# Patient Record
Sex: FEMALE | Race: WHITE | NOT HISPANIC OR LATINO | Employment: FULL TIME | URBAN - NONMETROPOLITAN AREA
[De-identification: names, ages, dates, MRNs, and addresses within clinical notes are randomized per-mention and may not be internally consistent; named-entity substitution may affect disease eponyms.]

---

## 2022-12-20 RX ORDER — CETIRIZINE HYDROCHLORIDE 10 MG/1
CAPSULE, LIQUID FILLED ORAL EVERY 24 HOURS
COMMUNITY

## 2022-12-20 RX ORDER — MONTELUKAST SODIUM 10 MG/1
10 TABLET ORAL
COMMUNITY

## 2022-12-20 RX ORDER — LEVOTHYROXINE SODIUM 0.03 MG/1
TABLET ORAL
COMMUNITY
Start: 2022-09-20

## 2022-12-20 RX ORDER — GUAIFENESIN 600 MG/1
1200 TABLET, EXTENDED RELEASE ORAL
COMMUNITY

## 2022-12-20 RX ORDER — IBUPROFEN 200 MG
200 TABLET ORAL
COMMUNITY
End: 2022-12-21

## 2022-12-20 RX ORDER — LORATADINE 10 MG/1
10 TABLET ORAL DAILY
COMMUNITY

## 2022-12-20 RX ORDER — ESTRADIOL 0.07 MG/D
1 FILM, EXTENDED RELEASE TRANSDERMAL
COMMUNITY
Start: 2022-03-17 | End: 2023-03-17

## 2022-12-20 RX ORDER — CYCLOBENZAPRINE HCL 10 MG
10 TABLET ORAL 3 TIMES DAILY PRN
COMMUNITY
End: 2022-12-21

## 2022-12-20 RX ORDER — OMEGA-3S/DHA/EPA/FISH OIL/D3 300MG-1000
CAPSULE ORAL
COMMUNITY

## 2022-12-20 RX ORDER — PROGESTERONE 200 MG/1
200 CAPSULE ORAL
COMMUNITY
Start: 2022-10-10 | End: 2023-10-10

## 2022-12-21 ENCOUNTER — CONSULT (OUTPATIENT)
Dept: PAIN MEDICINE | Facility: CLINIC | Age: 52
End: 2022-12-21

## 2022-12-21 ENCOUNTER — HOSPITAL ENCOUNTER (OUTPATIENT)
Dept: RADIOLOGY | Facility: CLINIC | Age: 52
Discharge: HOME | End: 2022-12-21

## 2022-12-21 VITALS
HEIGHT: 61 IN | SYSTOLIC BLOOD PRESSURE: 134 MMHG | HEART RATE: 101 BPM | WEIGHT: 196.6 LBS | DIASTOLIC BLOOD PRESSURE: 86 MMHG | BODY MASS INDEX: 37.12 KG/M2 | TEMPERATURE: 97.7 F | RESPIRATION RATE: 20 BRPM

## 2022-12-21 DIAGNOSIS — M47.816 LUMBAR SPONDYLOSIS: ICD-10-CM

## 2022-12-21 DIAGNOSIS — M53.3 SACROILIAC JOINT DYSFUNCTION OF BOTH SIDES: ICD-10-CM

## 2022-12-21 DIAGNOSIS — M47.816 LUMBAR SPONDYLOSIS: Primary | ICD-10-CM

## 2022-12-21 RX ORDER — CELECOXIB 200 MG/1
200 CAPSULE ORAL 2 TIMES DAILY
Qty: 60 CAPSULE | Refills: 0 | Status: SHIPPED | OUTPATIENT
Start: 2022-12-21

## 2022-12-21 NOTE — PROGRESS NOTES
Assessment  1  Lumbar spondylosis  -     X-ray lumbar spine 2 or 3 views; Future; Expected date: 12/21/2022    2  Sacroiliac joint dysfunction of both sides  -     X-ray lumbar spine 2 or 3 views; Future; Expected date: 12/21/2022    Left sided xial low back pain described primarily by arthritic features  Aching, nagging, indolent, stabbing, throbbing features in left sided axial low back without radicular components  5/5 strength bilaterally, negative SLR  weakly positive facet loading maneuvers in lumbar spine elicited pain, positive tenderness to palpation over lumbar paraspinal muscles  Findings correlate with prominent lumbar facet arthropathy seen throughout axial low back on x-ray from 2018  Currently she is neurologically intact without gait instability, saddle anesthesia or bowel/bladder abnormality  Lifestyle modifications extensively discussed including diet, exercise and weight loss as well as core strengthening/PT  Risks, benefits alternative to left sacroiliac joint injection, medial branch blocks and subsequent radiofrequency ablation of successful thoroughly discussed with patient  Handouts provided questions answered to patient satisfaction  Plan  -Left sacroiliac joint injection (patient to call back to schedule procedure); f/u 2 weeks post procedure  -xray lumbar spine; will f/u result with patient  -f/u 6 weeks after PT  -celebrex 200 mg b i d  prn pain prescribed  Patient educated regarding bleeding risk of taking this medication as well as not taking any other nonsteroidal anti-inflammatory medications while taking this medication; counseled thoroughly regarding potential risk of Cardiovascular injury, Kidney injury, Gastrointestinal ulceration/bleeding   Patient voiced understanding  -script provided for formal physical therapy for right-sided lumbar radiculopathy; Physician directed home exercise plan as per [facility] demonstrated and handouts provided that patient plans to participate with for 1 hour, twice a week for the next 6 weeks  There are risks associated with opioid medications, including dependence, addiction and tolerance  The patient understands and agrees to use these medications only as prescribed  Potential side effects of the medications include, but are not limited to, constipation, drowsiness, addiction, impaired judgment and risk of fatal overdose if not taken as prescribed  The patient was warned against driving while taking sedation medications or operating heavy machinery  The patient voiced understanding  Sharing medications is a felony  At this point in time, the patient is showing no signs of addiction, abuse, diversion or suicidal ideation  [facility] Prescription Drug Monitoring Program report was reviewed and was appropriate      Complete risks and benefits including bleeding, infection, tissue reaction, nerve injury and allergic reaction were discussed  The approach was demonstrated using models and literature was provided  Verbal and written consent was obtained  My impressions and treatment recommendations were discussed in detail with the patient who verbalized understanding and had no further questions  Discharge instructions were provided  I personally saw and examined the patient and I agree with the above discussed plan of care      New Medications Ordered This Visit   Medications   • ASCORBIC ACID PO     Sig: Take by mouth   • FEXOFENADINE HCL PO     Sig: Take by mouth   • MULTIPLE VITAMIN PO     Sig: Take 1 tablet by mouth daily   • MULTIPLE VITAMIN PO     Sig: Take 1 capsule by mouth daily   • PSEUDOEPHEDRINE HCL PO     Sig: Take by mouth   • Beclomethasone Dipropionate (QVAR IN)   • Turmeric (QC TUMERIC COMPLEX PO)     Sig: Take by mouth   • Cetirizine HCl (ZyrTEC Allergy) 10 MG CAPS     Sig: every 24 hours   • cholecalciferol (VITAMIN D3) 400 units tablet     Sig: Take by mouth   • cyclobenzaprine (FLEXERIL) 10 mg tablet     Sig: Take 10 mg by mouth Three times daily as needed   • estradiol (VIVELLE-DOT) 0 075 MG/24HR     Sig: Place 1 patch on the skin   • flurbiprofen (ANSAID) 100 mg tablet     Sig: Take 100 mg by mouth   • guaiFENesin (MUCINEX) 600 mg 12 hr tablet     Sig: Take 1,200 mg by mouth   • ibuprofen (MOTRIN) 200 mg tablet     Sig: Take 200 mg by mouth   • levothyroxine 25 mcg tablet     Sig: TAKE ONE TABLET BY MOUTH 60 MINUTES BEFORE MEAL  • loratadine (CLARITIN) 10 mg tablet     Sig: Take 10 mg by mouth daily   • montelukast (SINGULAIR) 10 mg tablet     Sig: Take 10 mg by mouth   • Progesterone 200 MG CAPS     Sig: Take 200 mg by mouth   • Multiple Vitamins-Minerals (EMERGEN-C BLUE PO)     Sig: Take by mouth       History of Present Illness    John Ventura is a 47 y o  female with pmhx of hypothyroidism, obesity presenting with chronic left sided low back pain described primarily as arthritic in nature  She describes 8/10 low back pain that is worse in the mornings and worse at the end of the day  The pain is characterized by achy, nagging, indolent, crampy, stabbing pain in her axial left low back  The patient describes that the pain is worse with standing for long periods of time on hard surfaces as well as with walking  The patient is a very active individual and feels as though this pain compromises her participation with independent activities of daily living  The pain can be debilitating at times and contribute to significant disability, compromising overall activity and independent activities of daily living  She has not yet tried physical therapy formally but is working with a  at [facility]  Medications the patient has tried in the past include flexeril, nsaids with modest relief  She describes no radicular symptoms and has good strength  She denies any weakness numbness or paresthesias  The patient denies any bowel or bladder dysfunction as well, saddle anesthesia or gait instability         I have personally reviewed and/or updated the patient's past medical history, past surgical history, family history, social history, current medications, allergies, and vital signs today  Review of Systems   Constitutional: Positive for activity change  HENT: Negative  Eyes: Negative  Respiratory: Negative  Cardiovascular: Negative  Gastrointestinal: Negative  Endocrine: Negative  Genitourinary: Negative  Musculoskeletal: Positive for arthralgias, back pain, gait problem and myalgias  Skin: Negative  Allergic/Immunologic: Negative  Neurological: Negative for weakness and numbness  Hematological: Negative  Psychiatric/Behavioral: Negative  All other systems reviewed and are negative  There is no problem list on file for this patient  Past Medical History:   Diagnosis Date   • Allergies    • Thyroid disease        Past Surgical History:   Procedure Laterality Date   •  SECTION     •  SECTION         Family History   Problem Relation Age of Onset   • Hypertension Mother    • Lung cancer Father        Social History     Occupational History   • Not on file   Tobacco Use   • Smoking status: Never   • Smokeless tobacco: Never   Vaping Use   • Vaping Use: Never used   Substance and Sexual Activity   • Alcohol use: Yes   • Drug use: Never   • Sexual activity: Yes       Current Outpatient Medications on File Prior to Visit   Medication Sig   • Cetirizine HCl (ZyrTEC Allergy) 10 MG CAPS every 24 hours   • cholecalciferol (VITAMIN D3) 400 units tablet Take by mouth   • estradiol (VIVELLE-DOT) 0 075 MG/24HR Place 1 patch on the skin   • flurbiprofen (ANSAID) 100 mg tablet Take 100 mg by mouth   • guaiFENesin (MUCINEX) 600 mg 12 hr tablet Take 1,200 mg by mouth   • levothyroxine 25 mcg tablet TAKE ONE TABLET BY MOUTH 60 MINUTES BEFORE MEAL     • MULTIPLE VITAMIN PO Take 1 tablet by mouth daily   • Multiple Vitamins-Minerals (EMERGEN-C BLUE PO) Take by mouth   • Progesterone 200 MG CAPS Take 200 mg by mouth   • PSEUDOEPHEDRINE HCL PO Take by mouth   • Turmeric (QC TUMERIC COMPLEX PO) Take by mouth   • ASCORBIC ACID PO Take by mouth (Patient not taking: Reported on 12/21/2022)   • Beclomethasone Dipropionate (QVAR IN)  (Patient not taking: Reported on 12/21/2022)   • cyclobenzaprine (FLEXERIL) 10 mg tablet Take 10 mg by mouth Three times daily as needed (Patient not taking: Reported on 12/21/2022)   • FEXOFENADINE HCL PO Take by mouth   • ibuprofen (MOTRIN) 200 mg tablet Take 200 mg by mouth (Patient not taking: Reported on 12/21/2022)   • loratadine (CLARITIN) 10 mg tablet Take 10 mg by mouth daily (Patient not taking: Reported on 12/21/2022)   • montelukast (SINGULAIR) 10 mg tablet Take 10 mg by mouth (Patient not taking: Reported on 12/21/2022)   • MULTIPLE VITAMIN PO Take 1 capsule by mouth daily (Patient not taking: Reported on 12/21/2022)     No current facility-administered medications on file prior to visit  Allergies   Allergen Reactions   • Molds & Smuts Other (See Comments)       Physical Exam    /86   Pulse 101   Temp 97 7 °F (36 5 °C)   Resp 20   Ht 5' 1" (1 549 m)   Wt 89 2 kg (196 lb 9 6 oz)   BMI 37 15 kg/m²     Constitutional: normal, well developed, well nourished, alert, in no distress and non-toxic and no overt pain behavior  and obese  Eyes: anicteric  HEENT: grossly intact  Neck: supple, symmetric, trachea midline and no masses   Pulmonary:even and unlabored  Cardiovascular:No edema or pitting edema present  Skin:Normal without rashes or lesions and well hydrated  Psychiatric:Mood and affect appropriate  Neurologic:Cranial Nerves II-XII grossly intact Sensation grossly intact; no clonus negative trammell's  Reflexes 2+ and brisk  SLR negative bilaterally  Musculoskeletal:normal gait  5/5 strength bilaterally with AROM in all extremities  pain with lumbar facet loading bilaterally and with lateral spine rotation  ttp over lumbar paraspinal muscles   Positive alex's test, gaenslen's, SIJ loading right sided    Imaging    2018 xray lumbar spine shows preserved disc spaces, mild facet joint arthrosis

## 2022-12-21 NOTE — PATIENT INSTRUCTIONS
Core Strengthening Exercises   WHAT YOU NEED TO KNOW:   Your core includes the muscles of your lower back, hip, pelvis, and abdomen  Core strengthening exercises help heal and strengthen these muscles  This helps prevent another injury, and keeps your pelvis, spine, and hips in the correct position  DISCHARGE INSTRUCTIONS:   Contact your healthcare provider if:   You have sharp or worsening pain during exercise or at rest     You have questions or concerns about your shoulder exercises  Safety tips:  Talk to your healthcare provider before you start an exercise program  A physical therapist can teach you how to do core strengthening exercises safely  Do the exercises on a mat or firm surface  A firm surface will support your spine and prevent low back pain  Do not do these exercises on a bed  Move slowly and smoothly  Avoid fast or jerky motions  Stop if you feel pain  Core exercises should not be painful  Stop if you feel pain  Breathe normally during core exercises  Do not hold your breath  This may cause an increase in blood pressure and prevent muscle strengthening  Your healthcare provider will tell you when to inhale and exhale during the exercise  Begin all of your exercises with abdominal bracing  Abdominal bracing helps warm up your core muscles  You can also practice abdominal bracing throughout the day  Lie on your back with your knees bent and feet flat on the floor  Place your arms in a relaxed position beside your body  Tighten your abdominal muscles  Pull your belly button in and up toward your spine  Hold for 5 seconds  Relax your muscles  Repeat 10 times  Core strengthening exercises: Your healthcare provider will tell you how often to do these exercises  The provider will also tell you how many repetitions of each exercise you should do  Hold each exercise for 5 seconds or as directed  As you get stronger, increase your hold to 10 to 15 seconds   You can do some of these exercises on a stability ball, or with a weight  Ask your healthcare provider how to use a stability ball or weight for these exercises:  Bridging:  Lie on your back with your knees bent and feet flat on the floor  Rest your arms at your side  Tighten your buttocks, and then lift your hips 1 inch off the floor  Hold for 5 seconds  When you can do this exercise without pain for 10 seconds, increase the distance you lift your hips  A good goal is to be able to lift your hips so that your shoulders, hips, and knees are in a straight line  Dead bug:  Lie on your back with your knees bent and feet flat on the floor  Place your arms in a relaxed position beside your body  Begin with abdominal bracing  Next, raise one leg, keeping your knee bent  Hold for 5 seconds  Repeat with the other leg  When you can do this exercise without pain for 10 to 15 seconds, you may raise one straight leg and hold  Repeat with the other leg  Quadruped:  Place your hands and knees on the floor  Keep your wrists directly below your shoulders and your knees directly below your hips  Pull your belly button in toward your spine  Do not flatten or arch your back  Tighten your abdominal muscles below your belly button  Hold for 5 seconds  When you can do this exercise without pain for 10 to 15 seconds, you may extend one arm and hold  Repeat on the other side  Side bridge exercises:      Standing side bridge:  Stand next to a wall and extend one arm toward the wall  Place your palm flat on the wall with your fingers pointing upward  Begin with abdominal bracing  Next, without moving your feet, slowly bend your arm to 90 degrees  Hold for 5 seconds  Repeat on the other side  When you can do this exercise without pain for 10 to 15 seconds, you may do the bent leg side bridge on the floor  Bent leg side bridge:  Lie on one side with your legs, hips, and shoulders in a straight line   Prop yourself up onto your forearm so your elbow is directly below your shoulder  Bend your knees back to 90 degrees  Begin with abdominal bracing  Next, lift your hips and balance yourself on your forearm and knees  Hold for 5 seconds  Repeat on the other side  When you can do this exercise without pain for 10 to 15 seconds, you may do the straight leg side bridge on the floor  Straight leg side bridge:  Lie on one side with your legs, hips, and shoulders in a straight line  Prop yourself up onto your forearm so your elbow is directly below your shoulder  Begin with abdominal bracing  Lift your hips off the floor and balance yourself on your forearm and the outside of your flexed foot  Do not let your ankle bend sideways  Hold for 5 seconds  Repeat on the other side  When you can do this exercise without pain for 10 to 15 seconds, ask your healthcare provider for more advanced exercises  Superman:  Lie on your stomach  Extend your arms forward on the floor  Tighten your abdominal muscles and lift your right hand and left leg off the floor  Hold this position  Slowly return to the starting position  Tighten your abdominal muscles and lift your left hand and right leg off the floor  Hold this position  Slowly return to the starting position  Clam:  Lie on your side with your knees bent  Put your bottom arm under your head to keep your neck in line  Put your top hand on your hip to keep your pelvis from moving  Put your heels together, and keep them together during this exercise  Slowly raise your top knee toward the ceiling  Then lower your leg so your knees are together  Repeat this exercise 10 times  Then switch sides and do the exercise 10 times with the other leg  Curl up:  Lie on your back with your knees bent and feet flat on the floor  Place your hands, palms down, underneath your lower back  Next, with your elbows on the floor, lift your shoulders and chest 2 to 3 inches off the floor   Keep your head in line with your shoulders  Hold this position  Slowly return to the starting position  Straight leg raises:  Lie on your back with one leg straight  Bend the other knee and place your foot flat on the floor  Tighten your abdominal muscles  Keep your leg straight and slowly lift it straight up 6 to 12 inches off the floor  Hold this position  Lower your leg slowly  Do as many repetitions as directed on this side  Repeat with the other leg  Plank:  Lie on your stomach  Bend your elbows and place your forearms flat on the floor  Lift your chest, stomach, and knees off the floor  Make sure your elbows are below your shoulders  Your body should be in a straight line  Do not let your hips or lower back sink to the ground  Squeeze your abdominal muscles together and hold for 15 seconds  To make this exercise harder, hold for 30 seconds or lift 1 leg at a time  Bicycles:  Lie on your back  Bend both knees and bring them toward your chest  Your calves should be parallel to the floor  Place the palms of your hands on the back of your head  Straighten your right leg and keep it lifted 2 inches off the floor  Raise your head and shoulders off the floor and twist towards your left  Keep your head and shoulders lifted  Bend your right knee while you straighten your left leg  Keep your left leg 2 inches off the floor  Twist your head and chest towards the left leg  Continue to straighten 1 leg at a time and twist        Follow up with your doctor as directed:  Write down your questions so you remember to ask them during your visits  © Copyright [employer] 2022 Information is for End User's use only and may not be sold, redistributed or otherwise used for commercial purposes  All illustrations and images included in CareNotes® are the copyrighted property of [employer]  or [facility] Angie Washington   The above information is an  only   It is not intended as medical advice for individual conditions or treatments  Talk to your doctor, nurse or pharmacist before following any medical regimen to see if it is safe and effective for you

## 2022-12-27 ENCOUNTER — TELEPHONE (OUTPATIENT)
Dept: RADIOLOGY | Facility: CLINIC | Age: 52
End: 2022-12-27

## 2022-12-27 NOTE — TELEPHONE ENCOUNTER
S/W pt and advised the same  CB with questions or concerns  Pt has yet to set up PT, but will be calling soon

## 2022-12-27 NOTE — TELEPHONE ENCOUNTER
----- Message from Izaiah Steele MD sent at 12/27/2022  7:20 AM EST -----  Has arthritis in lumbar spine on xray; should go through PT, may be a candidate for medial branch blocks/RFA if SIJ injection doesn't help with pain  ----- Message -----  From: Interface, Radiology Results In  Sent: 12/25/2022   1:36 PM EST  To: Izaiah Steele MD

## 2023-01-03 ENCOUNTER — EVALUATION (OUTPATIENT)
Dept: PHYSICAL THERAPY | Facility: CLINIC | Age: 53
End: 2023-01-03

## 2023-01-03 DIAGNOSIS — M54.51 VERTEBROGENIC LOW BACK PAIN: Primary | ICD-10-CM

## 2023-01-03 NOTE — PROGRESS NOTES
PT Evaluation     Today's date: 2023  Patient name: Torsten Tamayo  : 1970  MRN: [id number]  Referring provider: No ref  provider found  Dx:   Encounter Diagnosis     ICD-10-CM    1  Vertebrogenic low back pain  M54 [id number]           Start Time: 1600  Stop Time: 9395  Total time in clinic (min): 50 minutes    Assessment  Assessment details: Torsten Tamayo is a 47 y o  female presenting to PT with cc of chronic low back pain  Upon examination, patient was found to have objective deficits consisting of: decreased lumbar ROM, painful CPA, 0/5 SI provocation tests, and is displaying ss consistent with vertebrogenic low back pain  Pt  Is experiencing subsequent functional deficits including pain and difficulty walking, standing, and lifting  Pt  Was educated on role of PT to address issues and given initial treatment with HEP  Pt  Would benefit from skilled physical therapy to promote improved function and maximize activity tolerance      Impairments: abnormal or restricted ROM, abnormal movement, activity intolerance, impaired physical strength, pain with function, poor posture  and poor body mechanics  Understanding of Dx/Px/POC: good   Prognosis: good    Goals  Short Term Functional Goals:   In 4 weeks patient will:   Pt will report 2/10 Lumbar pain on VAS to allow housework and desk work    Improve functional mobility: Increased sitting and standing tolerance to 30 and 10 minutes respectively before changing position    Patient to be independent with HEP to maximize improvement in functional mobility    pt will increase FOTO score by 10pts to reflect a statistically significant improvement  Long Term Functional Goals:    In 6 weeks patient will:   -Patient will demonstrate [facility] pain-free Lumbar motion to better complete ADLs     -Improve functional mobility:  Patient will be able to sit at desk to complete work tasks for 2hr s pain    -The patient will go for 30 minutes walks 3-4 times per week and stretch daily to improve functional mobility and activity tolerance    -Pt will score at or above predicted discharge FOTO score of 66 to reflect improvement in subjective functional ability  Plan  Patient would benefit from: skilled physical therapy  Referral necessary: No  Planned modality interventions: cryotherapy, thermotherapy: hydrocollator packs and unattended electrical stimulation  Planned therapy interventions: manual therapy, neuromuscular re-education, patient education, therapeutic activities, therapeutic exercise and home exercise program  Frequency: 2x week  Duration in weeks: 6  Plan of Care beginning date: 1/3/2023  Plan of Care expiration date: 2023  Treatment plan discussed with: patient        Subjective Evaluation    History of Present Illness  Date of onset: 2022  Mechanism of injury: Willie Wyatt reports 7mth Hx of episodic lumbar pain that started most recently in July  CC at this time is sleeping positioning, and limited standing  Symptoms aggravated by  prolonged positioning  Symptoms improved by stretching  Conservative treatment to this point includes: starting to exercise 2x/wk     Symptoms change throughout the day:  No   Red Flags: No   Relevant Surgical Hx: no  Mechanism of injury: unknown  Positional preference: Yes (laying on side)   Symptoms distal to glute fold:   No            Recurrent probem    Quality of life: good    Pain  Current pain rating: 3  At best pain ratin  At worst pain ratin  Quality: radiating, tight and burning  Aggravating factors: sitting, standing and lifting    Patient Goals  Patient goals for therapy: decreased pain, increased motion, return to sport/leisure activities, independence with ADLs/IADLs and increased strength          Objective     Active Range of Motion     Lumbar   Flexion:  Restriction level: minimal  Extension:  Restriction level: moderate  Left lateral flexion:  Restriction level: moderate  Right lateral flexion:  Restriction level: moderate  Left rotation:  Restriction level: minimal  Right rotation:  Restriction level: minimal    Joint Play     Hypomobile: L5 and S1     Pain: L5 and S1     Strength/Myotome Testing     Lumbar   Left   Normal strength  Heel walk: normal  Toe walk: normal    Right   Normal strength  Heel walk: normal  Toe walk: normal    Tests     Lumbar   Negative SIJ compression, sacroiliac distraction, Gaenslen's , sacral thrust  and sacral spring   Left   Negative passive SLR and slump test      Right   Negative passive SLR and slump test      Left Hip   Negative long sit  Right Hip   Negative long sit                 Precautions: none     Daily Treatment Diary:      Initial Evaluation Date: 01/04/23  Compliance 1/3/23                     Visit Number 1                    Re-Eval  IE                 MC   Foto Captured y                           1/3/23                     Manual                      L/S STM                      Piriformis TpR                      LAD             L/S Mobs                                   Ther-Ex                      Active Warm-up                      LTR x10 5"                     DKTC x10 5"                                           Piriformis Stretch             Clamshells                      90/90 stretch                      Supine Bridge x10 5"                     Artemio pose 5x10"                                                                 Neuro Re-Ed                      TrA brace c Uni march 2x10                     BKFO             TrA [employer] 3-way                                       Ther-Act                                                               Modalities                      IFC with [facility]

## 2023-01-03 NOTE — LETTER
2023      No Recipients    Patient: Adonis Barnett   YOB: 1970   Date of Visit: 1/3/2023     Encounter Diagnosis     ICD-10-CM    1  Vertebrogenic low back pain  M54 51           Dear Dr Kailee Bates Recipients: Thank you for your recent referral of Adonis Barnett  Please review the attached evaluation summary from Ca's recent visit  Please verify that you agree with the plan of care by signing the attached order  If you have any questions or concerns, please do not hesitate to call  I sincerely appreciate the opportunity to share in the care of one of your patients and hope to have another opportunity to work with you in the near future  Sincerely,    Edger Lesches, PT      Referring Provider:      I certify that I have read the below Plan of Care and certify the need for these services furnished under this plan of treatment while under my care  No Recipients          PT Evaluation     Today's date: 2023  Patient name: Adonis Barnett  : 1970  MRN: [id number]  Referring provider: No ref  provider found  Dx:   Encounter Diagnosis     ICD-10-CM    1  Vertebrogenic low back pain  M54 51           Start Time: 1600  Stop Time: 4333  Total time in clinic (min): 50 minutes    Assessment  Assessment details: Adonis Barnett is a 47 y o  female presenting to PT with cc of chronic low back pain  Upon examination, patient was found to have objective deficits consisting of: decreased lumbar ROM, painful CPA, 0/5 SI provocation tests, and is displaying ss consistent with vertebrogenic low back pain  Pt  Is experiencing subsequent functional deficits including pain and difficulty walking, standing, and lifting  Pt  Was educated on role of PT to address issues and given initial treatment with HEP  Pt   Would benefit from skilled physical therapy to promote improved function and maximize activity tolerance      Impairments: abnormal or restricted ROM, abnormal movement, activity intolerance, impaired physical strength, pain with function, poor posture  and poor body mechanics  Understanding of Dx/Px/POC: good   Prognosis: good    Goals  Short Term Functional Goals:   In 4 weeks patient will:   Pt will report 2/10 Lumbar pain on VAS to allow housework and desk work    Improve functional mobility: Increased sitting and standing tolerance to 30 and 10 minutes respectively before changing position    Patient to be independent with HEP to maximize improvement in functional mobility    pt will increase FOTO score by 10pts to reflect a statistically significant improvement  Long Term Functional Goals:    In 6 weeks patient will:   -Patient will demonstrate [facility] pain-free Lumbar motion to better complete ADLs     -Improve functional mobility:  Patient will be able to sit at desk to complete work tasks for 2hr s pain    -The patient will go for 30 minutes walks 3-4 times per week and stretch daily to improve functional mobility and activity tolerance    -Pt will score at or above predicted discharge FOTO score of 66 to reflect improvement in subjective functional ability  Plan  Patient would benefit from: skilled physical therapy  Referral necessary: No  Planned modality interventions: cryotherapy, thermotherapy: hydrocollator packs and unattended electrical stimulation  Planned therapy interventions: manual therapy, neuromuscular re-education, patient education, therapeutic activities, therapeutic exercise and home exercise program  Frequency: 2x week  Duration in weeks: 6  Plan of Care beginning date: 1/3/2023  Plan of Care expiration date: 2/14/2023  Treatment plan discussed with: patient        Subjective Evaluation    History of Present Illness  Date of onset: 7/1/2022  Mechanism of injury: Alexander Wing reports 7mth Hx of episodic lumbar pain that started most recently in July  CC at this time is sleeping positioning, and limited standing   Symptoms aggravated by prolonged positioning  Symptoms improved by stretching  Conservative treatment to this point includes: starting to exercise 2x/wk  Symptoms change throughout the day:  No   Red Flags: No   Relevant Surgical Hx: no  Mechanism of injury: unknown  Positional preference: Yes (laying on side)   Symptoms distal to glute fold:   No            Recurrent probem    Quality of life: good    Pain  Current pain rating: 3  At best pain ratin  At worst pain ratin  Quality: radiating, tight and burning  Aggravating factors: sitting, standing and lifting    Patient Goals  Patient goals for therapy: decreased pain, increased motion, return to sport/leisure activities, independence with ADLs/IADLs and increased strength          Objective     Active Range of Motion     Lumbar   Flexion:  Restriction level: minimal  Extension:  Restriction level: moderate  Left lateral flexion:  Restriction level: moderate  Right lateral flexion:  Restriction level: moderate  Left rotation:  Restriction level: minimal  Right rotation:  Restriction level: minimal    Joint Play     Hypomobile: L5 and S1     Pain: L5 and S1     Strength/Myotome Testing     Lumbar   Left   Normal strength  Heel walk: normal  Toe walk: normal    Right   Normal strength  Heel walk: normal  Toe walk: normal    Tests     Lumbar   Negative SIJ compression, sacroiliac distraction, Gaenslen's , sacral thrust  and sacral spring   Left   Negative passive SLR and slump test      Right   Negative passive SLR and slump test      Left Hip   Negative long sit  Right Hip   Negative long sit                Precautions: none     Daily Treatment Diary:      Initial Evaluation Date: 23  Compliance 1/3/23                     Visit Number 1                    Re-Eval  IE                 [facility]   Foto Captured y                           1/3/23                     Manual                      L/S STM                      Piriformis TpR                      LAD             L/S Mobs                                   Ther-Ex                      Active Warm-up                      LTR x10 5"                     DKTC x10 5"                                           Piriformis Stretch             Clamshells                      90/90 stretch                      Supine Bridge x10 5"                     Artemio pose 5x10"                                                                 Neuro Re-Ed                      TrA brace c Uni march 2x10                     BKFO             TrA [employer] 3-way                                       Ther-Act                                                               Modalities                      IFC with P

## 2023-01-10 ENCOUNTER — OFFICE VISIT (OUTPATIENT)
Dept: PHYSICAL THERAPY | Facility: CLINIC | Age: 53
End: 2023-01-10

## 2023-01-10 DIAGNOSIS — M54.51 VERTEBROGENIC LOW BACK PAIN: Primary | ICD-10-CM

## 2023-01-10 NOTE — PROGRESS NOTES
Daily Note     Today's date: 1/10/2023  Patient name: Sam Taylor  : 1970  MRN: [id number]  Referring provider: Flora Malave MD  Dx:   Encounter Diagnosis     ICD-10-CM    1  Vertebrogenic low back pain  M54 51           Start Time: 1600  Stop Time: 4795  Total time in clinic (min): 50 minutes    Subjective: Pt reports mild improvement since last session  Pain reported as 2/10 at start of session  Objective: See treatment diary below  Assessment:  Sam Taylor was progressed with PT interventions, focusing on appropriate technique and intensity  Added mm activation exercises for spinal stabilization  They remain limited with prolonged positioning  Pt would benefit from continued skilled PT to resolve remaining functional impairments and facilitate return to PLOF  Plan: Continue per plan of care  Progress treatment as tolerated           Precautions: none     Daily Treatment Diary:      Initial Evaluation Date: 23  Compliance 1/3/23  1/10                   Visit Number 1 2                   Re-Eval  IE                 MC   Foto Captured y                           1/3/23  1/10                   Manual                      L/S STM                      Piriformis TpR                      LAD             L/S Mobs                                   Ther-Ex                      Active Warm-up                      LTR x10 5"  20x5"                   DKTC x10 5"  20x5"                                          Piriformis Stretch  6x20"           Clamshells   10x10"                   90/90 stretch                      Supine Bridge x10 5"  2x10 5"                    Artemio pose 5x10"  10x10"                                                               Neuro Re-Ed                      TrA brace c Uni march 2x10  2x10 (B)                   BKFO  2x10 (B)           TrA [employer] 3-way                                       Ther-Act Modalities                      IFC with MHP

## 2023-01-12 ENCOUNTER — OFFICE VISIT (OUTPATIENT)
Dept: PHYSICAL THERAPY | Facility: CLINIC | Age: 53
End: 2023-01-12

## 2023-01-12 DIAGNOSIS — M54.51 VERTEBROGENIC LOW BACK PAIN: Primary | ICD-10-CM

## 2023-01-12 NOTE — PROGRESS NOTES
Daily Note     Today's date: 2023  Patient name: Drew Howell  : 1970  MRN: [id number]  Referring provider: Santa Krikland MD  Dx:   Encounter Diagnosis     ICD-10-CM    1  Vertebrogenic low back pain  M54 51           Start Time: 0800  Stop Time: 4491  Total time in clinic (min): 50 minutes    Subjective: Pt reports good initial progress and has symp reduction  Still has morning stiffness  Pain reported as 2/10 at start of session  Objective: See treatment diary below  Assessment:  Drew Howell was progressed with PT interventions, focusing on appropriate technique and intensity  Continued exercises for spinal stabilization and req consistent VC for TrA activation  They remain limited with prolonged positioning  Pt would benefit from continued skilled PT to resolve remaining functional impairments and facilitate return to PLOF  Plan: Continue per plan of care  Progress treatment as tolerated           Precautions: none     Daily Treatment Diary:      Initial Evaluation Date: 23  Compliance 1/3/23  1/10  1/12                 Visit Number 1 2  3                 Re-Eval  IE                 MC   Foto Captured y                           1/3/23  1/10  1/12                 Manual                      L/S STM                      Piriformis TpR                      LAD             L/S Mobs                                   Ther-Ex                      Active Warm-up     5'                 LTR x10 5"  20x5"  20x5"                 DKTC x10 5"  20x5"   20x5"                                       Piriformis Stretch  6x20" 6x20"          Clamshells   10x10" 10x10"                 90/90 stretch                      Supine Bridge x10 5"  2x10 5"   2x10 5"                 Artemio pose 5x10"  10x10" 10x10"                 Seated Lumbar Flex    10x10"                                       Neuro Re-Ed                      TrA brace c  2x10  2x10 (B)  2x10 (B)                 BKFO 2x10 (B) 2x10 (B)          TrA Ball Press 3-way                                       Ther-Act                                                               Modalities                      IFC with P

## 2023-01-17 ENCOUNTER — OFFICE VISIT (OUTPATIENT)
Dept: PHYSICAL THERAPY | Facility: CLINIC | Age: 53
End: 2023-01-17

## 2023-01-17 DIAGNOSIS — M54.51 VERTEBROGENIC LOW BACK PAIN: Primary | ICD-10-CM

## 2023-01-17 NOTE — PROGRESS NOTES
Daily Note     Today's date: 2023  Patient name: Melecio Jack  : 1970  MRN: [id number]  Referring provider: Ignacio Montez MD  Dx:   Encounter Diagnosis     ICD-10-CM    1  Vertebrogenic low back pain  M54 51           Start Time: 1600  Stop Time:   Total time in clinic (min): 45 minutes    Subjective: Pt reports good initial progress and has symp reduction week over week  Still has morning stiffness  Pain reported as 2/10 at start of session  Objective: See treatment diary below  Assessment:  Melecio Jack was progressed with PT interventions, focusing on appropriate technique and intensity  Continued exercises for spinal stabilization and req consistent VC for TrA activation  They remain limited with prolonged positioning  Pt would benefit from continued skilled PT to resolve remaining functional impairments and facilitate return to PLOF  Plan: Continue per plan of care  Progress treatment as tolerated           Precautions: none     Daily Treatment Diary:      Initial Evaluation Date: 23  Compliance 1/3/23  1/10  1/12  1/17               Visit Number 1 2  3  4               Re-Eval  IE                 MC   Foto Captured y                           1/3/23  1/10  1/12  1/17               Manual                      L/S STM                      Piriformis TpR                      LAD             L/S Mobs                                   Ther-Ex                      Active Warm-up     5'  6'               LTR x10 5"  20x5"  20x5"  20x5"               DKTC x10 5"  20x5"   20x5"  20x5"                                     Piriformis Stretch  6x20" 6x20" 6x20"         Clamshells   10x10" 10x10" 10x10"               90/90 stretch                      Supine Bridge x10 5"  2x10 5"   2x10 5"  2x10 5"               Artemio pose 5x10"  10x10" 10x10"  10x10"               Seated Lumbar Flex    10x10"  10x10"                                     Neuro Re-Ed                      TrA brace c Uni march 2x10  2x10 (B)  2x10 (B)  2x10 (B)               BKFO  2x10 (B) 2x10 (B) 2x10 (B)         TrA Ball Press 3-way    10x ea         Paloff Press    10x5" (B)                      Ther-Act                                                               Modalities                      IFC with P

## 2023-01-19 ENCOUNTER — OFFICE VISIT (OUTPATIENT)
Dept: PHYSICAL THERAPY | Facility: CLINIC | Age: 53
End: 2023-01-19

## 2023-01-19 DIAGNOSIS — M54.51 VERTEBROGENIC LOW BACK PAIN: Primary | ICD-10-CM

## 2023-01-19 NOTE — PROGRESS NOTES
Daily Note     Today's date: 2023  Patient name: Yadira Chauhan  : 1970  MRN: [id number]  Referring provider: Abdi Neal MD  Dx:   Encounter Diagnosis     ICD-10-CM    1  Vertebrogenic low back pain  M54 51           Start Time: 0800  Stop Time: 0845  Total time in clinic (min): 45 minutes    Subjective: Pt states sleeping improving  Lumbar pain making consistent progress  Pain reported as 1/10 at start of session  Objective: See treatment diary below  Assessment:  Yadira Chauhan was progressed with PT interventions, focusing on appropriate technique and intensity  Continued exercises for spinal stabilization and req consistent VC for TrA activation  Exercises performed in variety of positions to improve Pt would benefit from continued skilled PT to resolve remaining functional impairments and facilitate return to PLOF  Plan: Continue per plan of care  Progress treatment as tolerated           Precautions: none     Daily Treatment Diary:      Initial Evaluation Date: 23  Compliance 1/3/23  1/10  1/12  1/17  1/19             Visit Number 1 2  3  4  5             Re-Eval  IE                 MC   Foto Captured y                           1/3/23  1/10  1/12  1/17  1/19             Manual                      L/S STM                      Piriformis TpR                      LAD             L/S Mobs                                   Ther-Ex                      Active Warm-up     5'  6'  6'             LTR x10 5"  20x5"  20x5"  20x5"  20x5"             DKTC x10 5"  20x5"   20x5"  20x5" 20x5"                                   Piriformis Stretch  6x20" 6x20" 6x20" 6x20"        Clamshells   10x10" 10x10" 10x10"  10x10"             90/90 stretch                      Supine Bridge x10 5"  2x10 5"   2x10 5"  2x10 5"  2x10 5"             Artemio pose 5x10"  10x10" 10x10"  10x10"  10x10"             Seated Lumbar Flex    10x10"  10x10"  10x10"                                   Neuro Re-Ed                      TrA brace c Uni march 2x10  2x10 (B)  2x10 (B)  2x10 (B)  2x10 (B)             BKFO  2x10 (B) 2x10 (B) 2x10 (B) 2x10 (B)        TrA Ball Press 3-way    10x ea 10x ea        Paloff Press    10x5" (B) 10x5" (B)                     Ther-Act                                                               Modalities                      IFC with P

## 2023-01-23 DIAGNOSIS — M53.3 SACROILIAC JOINT DYSFUNCTION OF BOTH SIDES: ICD-10-CM

## 2023-01-23 DIAGNOSIS — M47.816 LUMBAR SPONDYLOSIS: ICD-10-CM

## 2023-01-23 RX ORDER — DIPHENOXYLATE HYDROCHLORIDE AND ATROPINE SULFATE 2.5; .025 MG/1; MG/1
TABLET ORAL
COMMUNITY

## 2023-01-23 RX ORDER — IBUPROFEN 200 MG
TABLET ORAL
COMMUNITY
End: 2023-01-24

## 2023-01-24 ENCOUNTER — OFFICE VISIT (OUTPATIENT)
Dept: PHYSICAL THERAPY | Facility: CLINIC | Age: 53
End: 2023-01-24

## 2023-01-24 DIAGNOSIS — M54.51 VERTEBROGENIC LOW BACK PAIN: Primary | ICD-10-CM

## 2023-01-24 RX ORDER — CELECOXIB 200 MG/1
200 CAPSULE ORAL 2 TIMES DAILY
Qty: 60 CAPSULE | Refills: 0 | OUTPATIENT
Start: 2023-01-24

## 2023-01-24 RX ORDER — CELECOXIB 200 MG/1
200 CAPSULE ORAL 2 TIMES DAILY PRN
Qty: 60 CAPSULE | Refills: 0 | Status: SHIPPED | OUTPATIENT
Start: 2023-01-24 | End: 2023-01-27 | Stop reason: SDUPTHER

## 2023-01-24 NOTE — PROGRESS NOTES
Daily Note     Today's date: 2023  Patient name: Adriana Griffin  : 1970  MRN: [id number]  Referring provider: Eleni López MD  Dx:   Encounter Diagnosis     ICD-10-CM    1  Vertebrogenic low back pain  M54 51                      Subjective: Patient reports compliance to HEP, incremental progress with PT  No onset of sx with ADLS/AIDLS  Sleep is getting better, but is the major source of pain when sleeping on her belly  Usually stiff in the AM        Objective: See treatment diary below      Assessment: Tolerated treatment well  Patient demosntrated good BLE extensibility and lumbar mobility  Good core activation and coordination with maintaining brace and breathing, but fatigues quickly  Low tolerance to prolonged supine position  Continued PT would be beneficial to improve function           Plan: Continue per plan of care         Precautions: none     Daily Treatment Diary:      Initial Evaluation Date: 23  Compliance 1/3/23  1/10  1/12  1/17  1/19  1/24           Visit Number 1 2  3  4  5  6           Re-Eval  IE                 MC   Foto Captured y                           1/3/23  1/10  1/12  1/17  1/19  1/24           Manual                      L/S STM                      Piriformis TpR                      LAD             L/S Mobs                                   Ther-Ex                      Active Warm-up     5'  6'  6'  6'           LTR x10 5"  20x5"  20x5"  20x5"  20x5"  20x5"           DKTC x10 5"  20x5"   20x5"  20x5" 20x5"  20x5"                                 Piriformis Stretch  6x20" 6x20" 6x20" 6x20" 6x20"       Clamshells   10x10" 10x10" 10x10"  10x10"  10x10"           90/90 stretch           10x5"           Supine Bridge x10 5"  2x10 5"   2x10 5"  2x10 5"  2x10 5"  2x10 5"           Artemio pose 5x10"  10x10" 10x10"  10x10"  10x10"  10x10"           Seated Lumbar Flex    10x10"  10x10"  10x10"  10x10"                                 Neuro Re-Ed                      TrA brace c Uni march 2x10  2x10 (B)  2x10 (B)  2x10 (B)  2x10 (B)  2x10 (B)           BKFO  2x10 (B) 2x10 (B) 2x10 (B) 2x10 (B) 2x10 (B)       TrA Ball Press 3-way    10x ea 10x ea 10x ea       Paloff Press    10x5" (B) 10x5" (B) Red 10x5" B/L                    Ther-Act                                                               Modalities                      IFC with P

## 2023-01-26 ENCOUNTER — OFFICE VISIT (OUTPATIENT)
Dept: PHYSICAL THERAPY | Facility: CLINIC | Age: 53
End: 2023-01-26

## 2023-01-26 DIAGNOSIS — M54.51 VERTEBROGENIC LOW BACK PAIN: Primary | ICD-10-CM

## 2023-01-26 NOTE — PROGRESS NOTES
Daily Note     Today's date: 2023  Patient name: Tasneem Castrejon  : 1970  MRN: [id number]  Referring provider: Scott Schwarz MD  Dx:   Encounter Diagnosis     ICD-10-CM    1  Vertebrogenic low back pain  M54 51                      Subjective: Patient reports overall decrease in symptoms  Reports waking is most difficult but once she starts moving pain decreases  Objective: See treatment diary below      Assessment: Tasneem Castrejon continues with steady  progress towards core stability goals  Patient core recruitment appears to be improved  she required moderate verbal cueing to complete exercises appropriate form and intensity with no tactile cues  Patient function should improve with continued treatments  Plan: Continue per plan of care        Precautions: none     Daily Treatment Diary:      Initial Evaluation Date: 23  Compliance 1/3/23  1/10  1/12  1/17  1/19  1/24  1/26         Visit Number 1 2  3  4  5  6  7         Re-Eval  IE                 MC   Foto Captured y                           1/3/23  1/10  1/12  1/17  1/19  1/24  1/26         Manual                      L/S STM                      Piriformis TpR                      LAD             L/S Mobs                                   Ther-Ex                      Active Warm-up     5'  6'  6'  6' 6'         LTR x10 5"  20x5"  20x5"  20x5"  20x5"  20x5" 10"x10         DKTC x10 5"  20x5"   20x5"  20x5" 20x5"  20x5"  10"x10                               Piriformis Stretch  6x20" 6x20" 6x20" 6x20" 6x20" 6x20"      Clamshells   10x10" 10x10" 10x10"  10x10"  10x10" 10x10"         90/90 stretch           10x5"  10"x10         Supine Bridge x10 5"  2x10 5"   2x10 5"  2x10 5"  2x10 5"  2x10 5"  2x10 5"         Artemio pose 5x10"  10x10" 10x10"  10x10"  10x10"  10x10"  10"x10         Seated Lumbar Flex    10x10"  10x10"  10x10"  10x10"  10x10"                               Neuro Re-Ed                      TrA brace c  2x10  2x10 (B)  2x10 (B)  2x10 (B)  2x10 (B)  2x10 (B) 2x10 (B)         BKFO  2x10 (B) 2x10 (B) 2x10 (B) 2x10 (B) 2x10 (B) 2x10 (B)      TrA Ball Press 3-way    10x ea 10x ea 10x ea 10x ea      Paloff Press    10x5" (B) 10x5" (B) Red 10x5" B/L Red 10x5" B/L                   Ther-Act                                                               Modalities                      IFC with P

## 2023-01-27 ENCOUNTER — OFFICE VISIT (OUTPATIENT)
Dept: PAIN MEDICINE | Facility: CLINIC | Age: 53
End: 2023-01-27

## 2023-01-27 VITALS
BODY MASS INDEX: 37.72 KG/M2 | HEIGHT: 61 IN | DIASTOLIC BLOOD PRESSURE: 68 MMHG | WEIGHT: 199.8 LBS | TEMPERATURE: 97.4 F | RESPIRATION RATE: 20 BRPM | SYSTOLIC BLOOD PRESSURE: 130 MMHG

## 2023-01-27 DIAGNOSIS — M47.816 LUMBAR SPONDYLOSIS: ICD-10-CM

## 2023-01-27 DIAGNOSIS — M53.3 SACROILIAC JOINT DYSFUNCTION OF BOTH SIDES: Primary | ICD-10-CM

## 2023-01-27 RX ORDER — CELECOXIB 200 MG/1
200 CAPSULE ORAL 2 TIMES DAILY PRN
Qty: 60 CAPSULE | Refills: 2 | Status: SHIPPED | OUTPATIENT
Start: 2023-01-27

## 2023-01-27 NOTE — PATIENT INSTRUCTIONS
Core Strengthening Exercises   WHAT YOU NEED TO KNOW:   Your core includes the muscles of your lower back, hip, pelvis, and abdomen  Core strengthening exercises help heal and strengthen these muscles  This helps prevent another injury, and keeps your pelvis, spine, and hips in the correct position  DISCHARGE INSTRUCTIONS:   Contact your healthcare provider if:   You have sharp or worsening pain during exercise or at rest     You have questions or concerns about your shoulder exercises  Safety tips:  Talk to your healthcare provider before you start an exercise program  A physical therapist can teach you how to do core strengthening exercises safely  Do the exercises on a mat or firm surface  A firm surface will support your spine and prevent low back pain  Do not do these exercises on a bed  Move slowly and smoothly  Avoid fast or jerky motions  Stop if you feel pain  Core exercises should not be painful  Stop if you feel pain  Breathe normally during core exercises  Do not hold your breath  This may cause an increase in blood pressure and prevent muscle strengthening  Your healthcare provider will tell you when to inhale and exhale during the exercise  Begin all of your exercises with abdominal bracing  Abdominal bracing helps warm up your core muscles  You can also practice abdominal bracing throughout the day  Lie on your back with your knees bent and feet flat on the floor  Place your arms in a relaxed position beside your body  Tighten your abdominal muscles  Pull your belly button in and up toward your spine  Hold for 5 seconds  Relax your muscles  Repeat 10 times  Core strengthening exercises: Your healthcare provider will tell you how often to do these exercises  The provider will also tell you how many repetitions of each exercise you should do  Hold each exercise for 5 seconds or as directed  As you get stronger, increase your hold to 10 to 15 seconds   You can do some of these exercises on a stability ball, or with a weight  Ask your healthcare provider how to use a stability ball or weight for these exercises:  Bridging:  Lie on your back with your knees bent and feet flat on the floor  Rest your arms at your side  Tighten your buttocks, and then lift your hips 1 inch off the floor  Hold for 5 seconds  When you can do this exercise without pain for 10 seconds, increase the distance you lift your hips  A good goal is to be able to lift your hips so that your shoulders, hips, and knees are in a straight line  Dead bug:  Lie on your back with your knees bent and feet flat on the floor  Place your arms in a relaxed position beside your body  Begin with abdominal bracing  Next, raise one leg, keeping your knee bent  Hold for 5 seconds  Repeat with the other leg  When you can do this exercise without pain for 10 to 15 seconds, you may raise one straight leg and hold  Repeat with the other leg  Quadruped:  Place your hands and knees on the floor  Keep your wrists directly below your shoulders and your knees directly below your hips  Pull your belly button in toward your spine  Do not flatten or arch your back  Tighten your abdominal muscles below your belly button  Hold for 5 seconds  When you can do this exercise without pain for 10 to 15 seconds, you may extend one arm and hold  Repeat on the other side  Side bridge exercises:      Standing side bridge:  Stand next to a wall and extend one arm toward the wall  Place your palm flat on the wall with your fingers pointing upward  Begin with abdominal bracing  Next, without moving your feet, slowly bend your arm to 90 degrees  Hold for 5 seconds  Repeat on the other side  When you can do this exercise without pain for 10 to 15 seconds, you may do the bent leg side bridge on the floor  Bent leg side bridge:  Lie on one side with your legs, hips, and shoulders in a straight line   Prop yourself up onto your forearm so your elbow is directly below your shoulder  Bend your knees back to 90 degrees  Begin with abdominal bracing  Next, lift your hips and balance yourself on your forearm and knees  Hold for 5 seconds  Repeat on the other side  When you can do this exercise without pain for 10 to 15 seconds, you may do the straight leg side bridge on the floor  Straight leg side bridge:  Lie on one side with your legs, hips, and shoulders in a straight line  Prop yourself up onto your forearm so your elbow is directly below your shoulder  Begin with abdominal bracing  Lift your hips off the floor and balance yourself on your forearm and the outside of your flexed foot  Do not let your ankle bend sideways  Hold for 5 seconds  Repeat on the other side  When you can do this exercise without pain for 10 to 15 seconds, ask your healthcare provider for more advanced exercises  Superman:  Lie on your stomach  Extend your arms forward on the floor  Tighten your abdominal muscles and lift your right hand and left leg off the floor  Hold this position  Slowly return to the starting position  Tighten your abdominal muscles and lift your left hand and right leg off the floor  Hold this position  Slowly return to the starting position  Clam:  Lie on your side with your knees bent  Put your bottom arm under your head to keep your neck in line  Put your top hand on your hip to keep your pelvis from moving  Put your heels together, and keep them together during this exercise  Slowly raise your top knee toward the ceiling  Then lower your leg so your knees are together  Repeat this exercise 10 times  Then switch sides and do the exercise 10 times with the other leg  Curl up:  Lie on your back with your knees bent and feet flat on the floor  Place your hands, palms down, underneath your lower back  Next, with your elbows on the floor, lift your shoulders and chest 2 to 3 inches off the floor   Keep your head in line with your shoulders  Hold this position  Slowly return to the starting position  Straight leg raises:  Lie on your back with one leg straight  Bend the other knee and place your foot flat on the floor  Tighten your abdominal muscles  Keep your leg straight and slowly lift it straight up 6 to 12 inches off the floor  Hold this position  Lower your leg slowly  Do as many repetitions as directed on this side  Repeat with the other leg  Plank:  Lie on your stomach  Bend your elbows and place your forearms flat on the floor  Lift your chest, stomach, and knees off the floor  Make sure your elbows are below your shoulders  Your body should be in a straight line  Do not let your hips or lower back sink to the ground  Squeeze your abdominal muscles together and hold for 15 seconds  To make this exercise harder, hold for 30 seconds or lift 1 leg at a time  Bicycles:  Lie on your back  Bend both knees and bring them toward your chest  Your calves should be parallel to the floor  Place the palms of your hands on the back of your head  Straighten your right leg and keep it lifted 2 inches off the floor  Raise your head and shoulders off the floor and twist towards your left  Keep your head and shoulders lifted  Bend your right knee while you straighten your left leg  Keep your left leg 2 inches off the floor  Twist your head and chest towards the left leg  Continue to straighten 1 leg at a time and twist        Follow up with your doctor as directed:  Write down your questions so you remember to ask them during your visits  © Copyright [employer] 2022 Information is for End User's use only and may not be sold, redistributed or otherwise used for commercial purposes  All illustrations and images included in CareNotes® are the copyrighted property of [employer]  or [facility] Angie [place]   The above information is an  only   It is not intended as medical advice for individual conditions or treatments  Talk to your doctor, nurse or pharmacist before following any medical regimen to see if it is safe and effective for you

## 2023-01-27 NOTE — PROGRESS NOTES
Assessment  1  Sacroiliac joint dysfunction of both sides    2  Lumbar spondylosis    Left sided xial low back pain described primarily by arthritic features  Aching, nagging, indolent, stabbing, throbbing features in left sided axial low back without radicular components  5/5 strength bilaterally, negative SLR  weakly positive facet loading maneuvers in lumbar spine elicited pain, positive tenderness to palpation over lumbar paraspinal muscles  Findings correlate with prominent lumbar facet arthropathy seen throughout axial low back on x-ray from 2018; recent xray shows facet sclerosis at L5-S1  Currently she is neurologically intact without gait instability, saddle anesthesia or bowel/bladder abnormality  Lifestyle modifications extensively discussed including diet, exercise and weight loss as well as core strengthening/PT  Endorses good benefit with recent PT sessions  Risks, benefits alternative to left sacroiliac joint injection, medial branch blocks and subsequent radiofrequency ablation of successful thoroughly discussed with patient  Handouts provided questions answered to patient satisfaction  Plan  -Left sacroiliac joint injection (patient to call back to schedule procedure); f/u 2 weeks post procedure  -f/u prn to consider MRI lumbar spine noncontrast  -celebrex 200 mg b i d  prn pain prescribed; may wean  Patient educated regarding bleeding risk of taking this medication as well as not taking any other nonsteroidal anti-inflammatory medications while taking this medication; counseled thoroughly regarding potential risk of Cardiovascular injury, Kidney injury, Gastrointestinal ulceration/bleeding  Patient voiced understanding  -script provided for formal physical therapy for right-sided lumbar radiculopathy; Physician directed home exercise plan as per [facility] demonstrated and handouts provided that patient plans to participate with for 1 hour, twice a week for the next 6 weeks       There are risks associated with opioid medications, including dependence, addiction and tolerance  The patient understands and agrees to use these medications only as prescribed  Potential side effects of the medications include, but are not limited to, constipation, drowsiness, addiction, impaired judgment and risk of fatal overdose if not taken as prescribed  The patient was warned against driving while taking sedation medications or operating heavy machinery  The patient voiced understanding  Sharing medications is a felony  At this point in time, the patient is showing no signs of addiction, abuse, diversion or suicidal ideation  [facility] Prescription Drug Monitoring Program report was reviewed and was appropriate      Complete risks and benefits including bleeding, infection, tissue reaction, nerve injury and allergic reaction were discussed  The approach was demonstrated using models and literature was provided  Verbal and written consent was obtained  My impressions and treatment recommendations were discussed in detail with the patient who verbalized understanding and had no further questions  Discharge instructions were provided  I personally saw and examined the patient and I agree with the above discussed plan of care  New Medications Ordered This Visit   Medications   • multivitamin (THERAGRAN) TABS     Sig: Take by mouth       History of Present Illness    Emelyn Faria is a 47 y o  female with pmhx of hypothyroidism, obesity presenting with chronic left sided low back pain described primarily as arthritic in nature  She describes 8/10 low back pain that is worse in the mornings and worse at the end of the day  The pain is characterized by achy, nagging, indolent, crampy, stabbing pain in her axial left low back  The patient describes that the pain is worse with standing for long periods of time on hard surfaces as well as with walking    The patient is a very active individual and feels as though this pain compromises her participation with independent activities of daily living  The pain can be debilitating at times and contribute to significant disability, compromising overall activity and independent activities of daily living  She has tried physical therapy formally with good relief  Medications the patient has tried in the past include flexeril, nsaids with modest relief  She describes no radicular symptoms and has good strength  She denies any weakness numbness or paresthesias  The patient denies any bowel or bladder dysfunction as well, saddle anesthesia or gait instability  I have personally reviewed and/or updated the patient's past medical history, past surgical history, family history, social history, current medications, allergies, and vital signs today  Review of Systems   Constitutional: Positive for activity change  HENT: Negative  Eyes: Negative  Respiratory: Negative  Cardiovascular: Negative  Gastrointestinal: Negative  Endocrine: Negative  Genitourinary: Negative  Musculoskeletal: Positive for arthralgias, back pain, gait problem and myalgias  Skin: Negative  Allergic/Immunologic: Negative  Neurological: Negative for weakness and numbness  Hematological: Negative  Psychiatric/Behavioral: Negative  All other systems reviewed and are negative        Patient Active Problem List   Diagnosis   • Sacroiliac joint dysfunction of both sides   • Lumbar spondylosis       Past Medical History:   Diagnosis Date   • Allergies    • Thyroid disease        Past Surgical History:   Procedure Laterality Date   •  SECTION     •  SECTION         Family History   Problem Relation Age of Onset   • Hypertension Mother    • Lung cancer Father        Social History     Occupational History   • Not on file   Tobacco Use   • Smoking status: Never   • Smokeless tobacco: Never   Vaping Use   • Vaping Use: Never used   Substance and Sexual Activity   • Alcohol use: Yes   • Drug use: Never   • Sexual activity: Yes       Current Outpatient Medications on File Prior to Visit   Medication Sig   • celecoxib (CeleBREX) 200 mg capsule Take 1 capsule (200 mg total) by mouth 2 (two) times a day as needed for moderate pain   • Cetirizine HCl (ZyrTEC Allergy) 10 MG CAPS every 24 hours   • cholecalciferol (VITAMIN D3) 400 units tablet Take by mouth   • estradiol (VIVELLE-DOT) 0 075 MG/24HR Place 1 patch on the skin   • FEXOFENADINE HCL PO Take by mouth   • guaiFENesin (MUCINEX) 600 mg 12 hr tablet Take 1,200 mg by mouth   • levothyroxine 25 mcg tablet TAKE ONE TABLET BY MOUTH 60 MINUTES BEFORE MEAL  • MULTIPLE VITAMIN PO Take 1 tablet by mouth daily   • Progesterone 200 MG CAPS Take 200 mg by mouth   • Turmeric (QC TUMERIC COMPLEX PO) Take by mouth   • ASCORBIC ACID PO Take by mouth (Patient not taking: Reported on 12/21/2022)   • Beclomethasone Dipropionate (QVAR IN)  (Patient not taking: Reported on 12/21/2022)   • loratadine (CLARITIN) 10 mg tablet Take 10 mg by mouth daily (Patient not taking: Reported on 12/21/2022)   • montelukast (SINGULAIR) 10 mg tablet Take 10 mg by mouth (Patient not taking: Reported on 12/21/2022)   • MULTIPLE VITAMIN PO Take 1 capsule by mouth daily (Patient not taking: Reported on 12/21/2022)   • Multiple Vitamins-Minerals (EMERGEN-C BLUE PO) Take by mouth (Patient not taking: Reported on 1/27/2023)   • multivitamin (THERAGRAN) TABS Take by mouth (Patient not taking: Reported on 1/27/2023)   • PSEUDOEPHEDRINE HCL PO Take by mouth (Patient not taking: Reported on 1/27/2023)     No current facility-administered medications on file prior to visit         Allergies   Allergen Reactions   • Molds & Smuts Other (See Comments)       Physical Exam    /68   Temp (!) 97 4 °F (36 3 °C)   Resp 20   Ht 5' 1" (1 549 m)   Wt 90 6 kg (199 lb 12 8 oz)   BMI 37 75 kg/m²     Constitutional: normal, well developed, well nourished, alert, in no distress and non-toxic and no overt pain behavior  and obese  Eyes: anicteric  HEENT: grossly intact  Neck: supple, symmetric, trachea midline and no masses   Pulmonary:even and unlabored  Cardiovascular:No edema or pitting edema present  Skin:Normal without rashes or lesions and well hydrated  Psychiatric:Mood and affect appropriate  Neurologic:Cranial Nerves II-XII grossly intact Sensation grossly intact; no clonus negative trammell's  Reflexes 2+ and brisk  SLR negative bilaterally  Musculoskeletal:normal gait  5/5 strength bilaterally with AROM in all extremities  pain with lumbar facet loading bilaterally and with lateral spine rotation  ttp over lumbar paraspinal muscles   Positive alex's test, gaenslen's, SIJ loading right sided    Imaging    2018 xray lumbar spine shows preserved disc spaces, mild facet joint arthrosis

## 2023-01-31 ENCOUNTER — APPOINTMENT (OUTPATIENT)
Dept: PHYSICAL THERAPY | Facility: CLINIC | Age: 53
End: 2023-01-31

## 2023-05-31 DIAGNOSIS — M53.3 SACROILIAC JOINT DYSFUNCTION OF BOTH SIDES: ICD-10-CM

## 2023-05-31 DIAGNOSIS — M47.816 LUMBAR SPONDYLOSIS: ICD-10-CM

## 2023-05-31 RX ORDER — CELECOXIB 200 MG/1
200 CAPSULE ORAL 2 TIMES DAILY PRN
Qty: 60 CAPSULE | Refills: 2 | Status: SHIPPED | OUTPATIENT
Start: 2023-05-31

## 2023-11-20 ENCOUNTER — APPOINTMENT (OUTPATIENT)
Dept: RADIOLOGY | Facility: CLINIC | Age: 53
End: 2023-11-20
Payer: COMMERCIAL

## 2023-11-20 ENCOUNTER — OFFICE VISIT (OUTPATIENT)
Dept: OBGYN CLINIC | Facility: CLINIC | Age: 53
End: 2023-11-20
Payer: COMMERCIAL

## 2023-11-20 VITALS
HEART RATE: 102 BPM | WEIGHT: 179 LBS | HEIGHT: 61 IN | BODY MASS INDEX: 33.79 KG/M2 | DIASTOLIC BLOOD PRESSURE: 101 MMHG | SYSTOLIC BLOOD PRESSURE: 166 MMHG

## 2023-11-20 DIAGNOSIS — M25.562 CHRONIC PAIN OF LEFT KNEE: ICD-10-CM

## 2023-11-20 DIAGNOSIS — G89.29 CHRONIC PAIN OF LEFT KNEE: ICD-10-CM

## 2023-11-20 DIAGNOSIS — M25.562 ACUTE PAIN OF LEFT KNEE: ICD-10-CM

## 2023-11-20 DIAGNOSIS — M17.12 PRIMARY OSTEOARTHRITIS OF LEFT KNEE: Primary | ICD-10-CM

## 2023-11-20 PROCEDURE — 20610 DRAIN/INJ JOINT/BURSA W/O US: CPT | Performed by: ORTHOPAEDIC SURGERY

## 2023-11-20 PROCEDURE — 99203 OFFICE O/P NEW LOW 30 MIN: CPT | Performed by: ORTHOPAEDIC SURGERY

## 2023-11-20 PROCEDURE — 73562 X-RAY EXAM OF KNEE 3: CPT

## 2023-11-20 PROCEDURE — 73564 X-RAY EXAM KNEE 4 OR MORE: CPT

## 2023-11-20 RX ADMIN — BUPIVACAINE HYDROCHLORIDE 4 ML: 2.5 INJECTION, SOLUTION INFILTRATION; PERINEURAL at 12:15

## 2023-11-20 RX ADMIN — TRIAMCINOLONE ACETONIDE 40 MG: 40 INJECTION, SUSPENSION INTRA-ARTICULAR; INTRAMUSCULAR at 12:15

## 2023-11-20 NOTE — PROGRESS NOTES
Ortho Sports Medicine New Patient Visit     Assesment:   47 y.o. female with primary osteoarthritis of the left knee and right knee. Left is more symptomatic. Plan:    Gita Choi is a pleasant 47year-old female who presents for initial evaluation of the left knee. After obtaining a thorough history, orthopedic exam, and reviewing imaging I believe her symptom are consistent with severe osteoarthritis of the left knee. She does have significant arthritis on the contralateral side as well, but this is not as symptomatic. We did discuss that the only surgical intervention appropriate for her would be a total knee arthroplasty. Non-operative treatments include corticosteroid injections and visco supplementation injections, as well as physical therapy. She would like to proceed with a corticosteroid injection today. She will follow up with me as needed. We did provide her the names of several colleagues that are closer to her home that she may see if she feels that she would like to discuss a knee replacement. Large joint arthrocentesis: L knee  Universal Protocol:  Consent: Verbal consent obtained. Risks and benefits: risks, benefits and alternatives were discussed  Consent given by: patient  Time out: Immediately prior to procedure a "time out" was called to verify the correct patient, procedure, equipment, support staff and site/side marked as required.   Patient understanding: patient states understanding of the procedure being performed  Site marked: the operative site was marked  Patient identity confirmed: verbally with patient  Supporting Documentation  Indications: pain   Procedure Details  Location: knee - L knee  Preparation: Patient was prepped and draped in the usual sterile fashion  Needle size: 22 G  Medications administered: 4 mL bupivacaine 0.25 %; 40 mg triamcinolone acetonide 40 mg/mL    Patient tolerance: patient tolerated the procedure well with no immediate complications  Dressing:  Sterile dressing applied      Follow up:    No follow-ups on file. Chief Complaint   Patient presents with    Left Knee - Pain       History of Present Illness: The patient is a 47 y.o. female who presents for initial evaluation of the left knee. She recalls an injury from 25 years ago of an ACL tear that was treated non-operatively. She states the past 3 years the knee has been more bothersome; however, the past year has been worse. She notes the knee pain has effected her walking. She can hear "crunches" in the knee when working out at the gym. She feels she relies on the right knee more. She feels her quality of life is being effected by the knee pain. She takes Celebrex for back pain, which she feels helps with the knee somewhat. She also takes Advil as needed for her pain.       Knee Surgical History:  None    Past Medical, Social and Family History:  Past Medical History:   Diagnosis Date    Allergies     Thyroid disease      Past Surgical History:   Procedure Laterality Date     SECTION       SECTION       Allergies   Allergen Reactions    Molds & Smuts Other (See Comments)     Current Outpatient Medications on File Prior to Visit   Medication Sig Dispense Refill    ASCORBIC ACID PO Take by mouth (Patient not taking: Reported on 2022)      Beclomethasone Dipropionate (QVAR IN)  (Patient not taking: Reported on 2022)      celecoxib (CeleBREX) 200 mg capsule TAKE 1 CAPSULE (200 MG TOTAL) BY MOUTH 2 (TWO) TIMES A DAY AS NEEDED FOR MODERATE PAIN 60 capsule 2    Cetirizine HCl (ZyrTEC Allergy) 10 MG CAPS every 24 hours      cholecalciferol (VITAMIN D3) 400 units tablet Take by mouth      estradiol (VIVELLE-DOT) 0.075 MG/24HR Place 1 patch on the skin      FEXOFENADINE HCL PO Take by mouth      guaiFENesin (MUCINEX) 600 mg 12 hr tablet Take 1,200 mg by mouth      levothyroxine 25 mcg tablet TAKE ONE TABLET BY MOUTH 60 MINUTES BEFORE MEAL.      loratadine (CLARITIN) 10 mg tablet Take 10 mg by mouth daily (Patient not taking: Reported on 12/21/2022)      montelukast (SINGULAIR) 10 mg tablet Take 10 mg by mouth (Patient not taking: Reported on 12/21/2022)      MULTIPLE VITAMIN PO Take 1 tablet by mouth daily      MULTIPLE VITAMIN PO Take 1 capsule by mouth daily (Patient not taking: Reported on 12/21/2022)      Multiple Vitamins-Minerals (EMERGEN-C BLUE PO) Take by mouth (Patient not taking: Reported on 1/27/2023)      multivitamin (THERAGRAN) TABS Take by mouth (Patient not taking: Reported on 1/27/2023)      PSEUDOEPHEDRINE HCL PO Take by mouth (Patient not taking: Reported on 1/27/2023)      Turmeric (QC TUMERIC COMPLEX PO) Take by mouth       No current facility-administered medications on file prior to visit. Social History     Socioeconomic History    Marital status: /Civil Union     Spouse name: Not on file    Number of children: Not on file    Years of education: Not on file    Highest education level: Not on file   Occupational History    Not on file   Tobacco Use    Smoking status: Never    Smokeless tobacco: Never   Vaping Use    Vaping Use: Never used   Substance and Sexual Activity    Alcohol use: Yes    Drug use: Never    Sexual activity: Yes   Other Topics Concern    Not on file   Social History Narrative    Not on file     Social Determinants of Health     Financial Resource Strain: Not on file   Food Insecurity: Not on file   Transportation Needs: Not on file   Physical Activity: Not on file   Stress: Not on file   Social Connections: Not on file   Intimate Partner Violence: Not on file   Housing Stability: Not on file         I have reviewed the past medical, surgical, social and family history, medications and allergies as documented in the EMR. Review of systems: ROS is negative other than that noted in the HPI. Constitutional: Negative for fatigue and fever. HENT: Negative for sore throat. Respiratory: Negative for shortness of breath. Cardiovascular: Negative for chest pain. Gastrointestinal: Negative for abdominal pain. Endocrine: Negative for cold intolerance and heat intolerance. Genitourinary: Negative for flank pain. Musculoskeletal: Negative for back pain. Skin: Negative for rash. Allergic/Immunologic: Negative for immunocompromised state. Neurological: Negative for dizziness. Psychiatric/Behavioral: Negative for agitation. Physical Exam:    Blood pressure (!) 166/101, pulse 102, height 5' 1" (1.549 m), weight 81.2 kg (179 lb). General/Constitutional: NAD, well developed, well nourished  HENT: Normocephalic, atraumatic  CV: Intact distal pulses, regular rate  Resp: No respiratory distress or labored breathing  Abdomen: soft, nondistended   Lymphatic: No lymphadenopathy palpated  Neuro: Alert and Oriented x 3, no focal deficits  Psych: Normal mood, normal affect  Skin: Warm, dry, no rashes, no erythema      Knee Exam:   No significant skin lesions or deformity  Range of motion from 5° short of full extension to 110°  Medial joint line tenderness   Knee is stable to varus stress, Lachman, and posterior drawer. Some gapping with valgus stress with a firm end-point. Slight anterior translation with Anterior Drawer with a firm end-point. Neurovascularly intact distally    Knee Imaging    X-rays of the left knee reviewed and interpreted today. These show severe osteoarthritis in all three compartments of the knee.     Scribe Attestation      I,:  Gabriel Thurman am acting as a scribe while in the presence of the attending physician.:       I,:  Praveen Martin MD personally performed the services described in this documentation    as scribed in my presence.:

## 2023-11-21 DIAGNOSIS — M53.3 SACROILIAC JOINT DYSFUNCTION OF BOTH SIDES: ICD-10-CM

## 2023-11-21 DIAGNOSIS — M47.816 LUMBAR SPONDYLOSIS: ICD-10-CM

## 2023-11-21 RX ORDER — CELECOXIB 200 MG/1
200 CAPSULE ORAL 2 TIMES DAILY PRN
Qty: 60 CAPSULE | Refills: 2 | Status: SHIPPED | OUTPATIENT
Start: 2023-11-21

## 2023-11-27 RX ORDER — TRIAMCINOLONE ACETONIDE 40 MG/ML
40 INJECTION, SUSPENSION INTRA-ARTICULAR; INTRAMUSCULAR
Status: COMPLETED | OUTPATIENT
Start: 2023-11-20 | End: 2023-11-20

## 2023-11-27 RX ORDER — BUPIVACAINE HYDROCHLORIDE 2.5 MG/ML
4 INJECTION, SOLUTION INFILTRATION; PERINEURAL
Status: COMPLETED | OUTPATIENT
Start: 2023-11-20 | End: 2023-11-20

## 2023-11-29 DIAGNOSIS — M17.12 PRIMARY OSTEOARTHRITIS OF LEFT KNEE: Primary | ICD-10-CM

## 2023-11-29 DIAGNOSIS — M17.11 ARTHRITIS OF KNEE, RIGHT: ICD-10-CM

## 2023-12-05 ENCOUNTER — EVALUATION (OUTPATIENT)
Dept: PHYSICAL THERAPY | Facility: CLINIC | Age: 53
End: 2023-12-05
Payer: COMMERCIAL

## 2023-12-05 DIAGNOSIS — M17.11 ARTHRITIS OF KNEE, RIGHT: ICD-10-CM

## 2023-12-05 DIAGNOSIS — M17.12 PRIMARY OSTEOARTHRITIS OF LEFT KNEE: ICD-10-CM

## 2023-12-05 PROCEDURE — 97161 PT EVAL LOW COMPLEX 20 MIN: CPT | Performed by: PHYSICAL THERAPIST

## 2023-12-05 PROCEDURE — 97140 MANUAL THERAPY 1/> REGIONS: CPT | Performed by: PHYSICAL THERAPIST

## 2023-12-05 PROCEDURE — 97110 THERAPEUTIC EXERCISES: CPT | Performed by: PHYSICAL THERAPIST

## 2023-12-05 NOTE — PROGRESS NOTES
PT Evaluation     Today's date: 2023  Patient name: Casper Crabtree  : 1970  MRN: [id number]  Referring provider: Donya Stark  Dx:   Encounter Diagnosis     ICD-10-CM    1. Primary osteoarthritis of left knee  M17.12 Ambulatory Referral to Physical Therapy      2. Arthritis of knee, right  M17.11 Ambulatory Referral to Physical Therapy                     Assessment  Assessment details: Pt presents to PT with signs and symptoms consistent with knee DJD. She confirmed ACL deficient knee. She is missing terminal knee ext and has poor quad isolation and hip strength. Skilled PT warranted to address findings and progress towards outlined goals. Pt in agreement with current POC. Symptom irritability: moderateUnderstanding of Dx/Px/POC: good   Prognosis: fair    Goals  St weeks  Full knee extension  Demonstrate strong quad isolated activity  Independent with hip strength HEP    LTG: not appropriate at this time    Plan  Plan details: TE, NMR, TA, MT, self education, and modalities as needed in order to progress through skilled PT focused on  ROM, strength, balance, coordination       Patient would benefit from: skilled physical therapy  Planned modality interventions: cryotherapy and thermotherapy: hydrocollator packs  Planned therapy interventions: manual therapy, neuromuscular re-education, self care, therapeutic activities, therapeutic exercise and home exercise program  Frequency: 2x week  Duration in weeks: 6  Plan of Care beginning date: 2023  Plan of Care expiration date: 2024  Treatment plan discussed with: patient        Subjective Evaluation    History of Present Illness  Mechanism of injury: 47year old female presenting to PT with chronic L knee pain. Tore ACL when in early 20's and did not get repaired. Over the past few years as she has notably got more sedentary as she works from home, it has gotten worse and at times limps. She tries to workout 2x/wk.      Saw ortho recently who recommended she look into total replacement. Did get injection which helped. After working out and then sitting, it is uncomfortable. Walking longer distances are difficult. Walking down hills and descending stairs are harder and uncomfortable. Patient Goals  Patient goals for therapy: decreased pain, increased motion, increased strength and independence with ADLs/IADLs  Patient goal: walk without pain  Pain  At best pain ratin  At worst pain ratin        Objective  Gait  Some limping occasionally on L LE      ROM  R 0-135  L 5-117      Passive motion  Stiff with terminal knee ext - L      Strength  Hip flex: 4-/5 L  Hip abd: 3/5 L  Quad/ham/ankle DF: 5/5      Hip screen  Symmetrical and WFL      Circumference/girth  General swelling noted along L knee    quad isolation: poor       Precautions: L knee ACL deficient    Daily Treatment Diary:      Initial Evaluation Date: 23  POC Expiration: 24  Compliance 23                     Visit Number 1                    Re-Eval  IE                 [facility]   Foto Captured Y                          Manuals         Joint work         -hip         -knee ER rot mob terminal ext        ST work Retrograde L knee        Flexibility/PROM                           Neuro Re-Ed         balance                           Step-ups fwd         Step-ups lat                  lunge         Ther Ex         NuStep/bike         Ham stretch 5x30"        Gastroc stretch 5x30"        Quad sets x20                 SLR program x12        manuel beaulieu         Leg press         Hamstring strength         Quad strength         education Provided and reviewed        Ther Activity         Stair negotiation                  Gait Training                           Modalities                             Access Code: Will Noriega  URL: [email or web address]  Date: 2023  Prepared by: Prabhjot Ojeda    Exercises  - Supine Hamstring Stretch with Strap  - 1 x daily - 7 x weekly - 3 sets - 10 reps  - Long Sitting Calf Stretch with Strap  - 1 x daily - 7 x weekly - 3 sets - 10 reps  - Long Sitting Quad Set  - 1 x daily - 7 x weekly - 3 sets - 10 reps  - Supine Active Straight Leg Raise  - 1 x daily - 7 x weekly - 3 sets - 10 reps

## 2023-12-07 ENCOUNTER — OFFICE VISIT (OUTPATIENT)
Dept: PHYSICAL THERAPY | Facility: CLINIC | Age: 53
End: 2023-12-07
Payer: COMMERCIAL

## 2023-12-07 DIAGNOSIS — M17.11 ARTHRITIS OF KNEE, RIGHT: ICD-10-CM

## 2023-12-07 DIAGNOSIS — M17.12 PRIMARY OSTEOARTHRITIS OF LEFT KNEE: Primary | ICD-10-CM

## 2023-12-07 PROCEDURE — 97110 THERAPEUTIC EXERCISES: CPT

## 2023-12-07 PROCEDURE — 97140 MANUAL THERAPY 1/> REGIONS: CPT

## 2023-12-07 NOTE — PROGRESS NOTES
Daily Note     Today's date: 2023  Patient name: Viky Noe  : 1970  MRN: [id number]  Referring provider: Nolan Ross  Dx:   Encounter Diagnosis     ICD-10-CM    1. Primary osteoarthritis of left knee  M17.12       2. Arthritis of knee, right  M17.11                      Subjective:  Patient states she is doing alright. Objective: See treatment diary below      Assessment: Tolerated treatment well. Focused on open chain exercises today. Good response to manuals. She does present with moderate tightness/ tenderness. Encouraged use of MHP to help with muscular tightness. Patient demonstrated fatigue post treatment, exhibited good technique with therapeutic exercises, and would benefit from continued PT      Plan: Continue per plan of care. Precautions: L knee ACL deficient    Daily Treatment Diary:      Initial Evaluation Date: 23  POC Expiration: 24  Compliance 23                   Visit Number 1 2                   Re-Eval  IE                 MC   Foto Captured Y                          Manuals        Joint work         -hip         -knee ER rot mob terminal ext NV        ST work Retrograde L knee Retrograde L knee       Flexibility/PROM                           Neuro Re-Ed         balance                           Step-ups fwd         Step-ups lat                  lunge         Ther Ex         NuStep/bike         Ham stretch 5x30" 5x 30"       Gastroc stretch 5x30" 5x30"       Quad sets x20 x20                SLR program x12 X12        clamshells         bridges         Leg press         Hamstring strength         Quad strength         education Provided and reviewed        Ther Activity         Stair negotiation                  Gait Training                           Modalities                             Access Code: Segundo Strong  URL: [email or web address]  Date: 2023  Prepared by: Kan Seth  - Supine Hamstring Stretch with Strap  - 1 x daily - 7 x weekly - 3 sets - 10 reps  - Long Sitting Calf Stretch with Strap  - 1 x daily - 7 x weekly - 3 sets - 10 reps  - Long Sitting Quad Set  - 1 x daily - 7 x weekly - 3 sets - 10 reps  - Supine Active Straight Leg Raise  - 1 x daily - 7 x weekly - 3 sets - 10 reps

## 2023-12-11 ENCOUNTER — OFFICE VISIT (OUTPATIENT)
Dept: PHYSICAL THERAPY | Facility: CLINIC | Age: 53
End: 2023-12-11
Payer: COMMERCIAL

## 2023-12-11 DIAGNOSIS — M17.12 PRIMARY OSTEOARTHRITIS OF LEFT KNEE: Primary | ICD-10-CM

## 2023-12-11 DIAGNOSIS — M17.11 ARTHRITIS OF KNEE, RIGHT: ICD-10-CM

## 2023-12-11 PROCEDURE — 97110 THERAPEUTIC EXERCISES: CPT

## 2023-12-11 PROCEDURE — 97140 MANUAL THERAPY 1/> REGIONS: CPT

## 2023-12-11 NOTE — PROGRESS NOTES
Daily Note     Today's date: 2023  Patient name: Demarco Giordano  : 1970  MRN: [id number]  Referring provider: Tere Ruiz  Dx:   Encounter Diagnosis     ICD-10-CM    1. Primary osteoarthritis of left knee  M17.12       2. Arthritis of knee, right  M17.11                      Subjective: L knee feels ok, not much pain since the injection. Objective: See treatment diary below      Assessment: Tolerated treatment fair. Antalgic gait noted. Apprehension of going into full knee extension when standing, fearful of knee buckling. Fair quad engagement, lag noted with SLR. Good tolerance to new tasks, updated HEP. Continued PT would be beneficial to improve function. Plan: Continue per plan of care.        Precautions: L knee ACL deficient    Daily Treatment Diary:      Initial Evaluation Date: 23  POC Expiration: 24  Compliance 23                   Visit Number 1 2                   Re-Eval  IE                 [facility]   Foto Captured Y                          Manuals       Joint work         -hip         -knee ER rot mob terminal ext NV        ST work Retrograde L knee Retrograde L knee Retrograde L knee      Flexibility/PROM   ext                        Neuro Re-Ed         balance                           Step-ups fwd         Step-ups lat                  lunge         Ther Ex         NuStep/bike   Nu 6'      Ham stretch 5x30" 5x 30" 5x 30"      Gastroc stretch 5x30" 5x30" 5x30" long sit      Quad sets x20 x20 x20               SLR program x12 X12  2x8      clamshells   S/l x10, x10 rtb RTB     bridges   2x10 3"      Leg press         hip abd standing   2x10      Hamstring strength         Quad strength         education Provided and reviewed        Ther Activity         Stair negotiation                  Gait Training                           Modalities                             Access Code: Korey Hester  URL: [email or web address]  Date: 12/05/2023  Prepared by: Philip Correa    Exercises  - Supine Hamstring Stretch with Strap  - 1 x daily - 7 x weekly - 3 sets - 10 reps  - Long Sitting Calf Stretch with Strap  - 1 x daily - 7 x weekly - 3 sets - 10 reps  - Long Sitting Quad Set  - 1 x daily - 7 x weekly - 3 sets - 10 reps  - Supine Active Straight Leg Raise  - 1 x daily - 7 x weekly - 3 sets - 10 reps  - Standing Hip Abduction  - 1 x daily - 7 x weekly - 3 sets - 10 reps  - Clamshell with Resistance  - 1 x daily - 7 x weekly - 3 sets - 10 reps  - Supine Bridge  - 1 x daily - 7 x weekly - 3 sets - 10 reps

## 2023-12-13 ENCOUNTER — TELEPHONE (OUTPATIENT)
Age: 53
End: 2023-12-13

## 2023-12-13 ENCOUNTER — OFFICE VISIT (OUTPATIENT)
Dept: PHYSICAL THERAPY | Facility: CLINIC | Age: 53
End: 2023-12-13
Payer: COMMERCIAL

## 2023-12-13 DIAGNOSIS — M17.11 ARTHRITIS OF KNEE, RIGHT: ICD-10-CM

## 2023-12-13 DIAGNOSIS — M17.12 PRIMARY OSTEOARTHRITIS OF LEFT KNEE: Primary | ICD-10-CM

## 2023-12-13 PROCEDURE — 97110 THERAPEUTIC EXERCISES: CPT

## 2023-12-13 PROCEDURE — 97140 MANUAL THERAPY 1/> REGIONS: CPT

## 2023-12-13 NOTE — TELEPHONE ENCOUNTER
Caller: Viktoriya TOMLINSON [facility]    Doctor: lenny    Reason for call: PT called would like plan of care sign and faxed over, was send in 1/31/2023.  They will be refaxing it over    Fax# [phone]  Call back#: [phone]

## 2023-12-13 NOTE — PROGRESS NOTES
Daily Note     Today's date: 2023  Patient name: Kathy Corral  : 1970  MRN: [id number]  Referring provider: Dwight Ramirez  Dx:   Encounter Diagnosis     ICD-10-CM    1. Primary osteoarthritis of left knee  M17.12       2. Arthritis of knee, right  M17.11                      Subjective: Patient reports knee is doing ok. Reports had some restless leg going on last night. Objective: See treatment diary below      Assessment: Kathy Corral tolerated treatment well. She continues with tenderness at available end range. Continued treatment indicated. Plan: Continue per plan of care. Precautions: L knee ACL deficient    Daily Treatment Diary:      Initial Evaluation Date: 23  POC Expiration: 24  Compliance 23               Visit Number 1 2  3  4               Re-Eval  IE                 [facility]   Foto Captured Y                          Manuals      Joint work         -hip         -knee ER rot mob terminal ext NV        ST work Retrograde L knee Retrograde L knee Retrograde L knee Retrograde L knee     Flexibility/PROM   ext ext                       Neuro Re-Ed         balance                           Step-ups fwd         Step-ups lat                  lunge         Ther Ex         NuStep/bike   Nu 6' Nu 6'     Ham stretch 5x30" 5x 30" 5x 30" 4x30"     Gastroc stretch 5x30" 5x30" 5x30" long sit 5x30" long sit     Quad sets x20 x20 x20 5"x20              SLR program x12 X12  2x8 2x8     clamshells   S/l x10, x10 rtb S/l RTB  5"x20     bridges   2x10 3" 2x10 3"     Leg press         hip abd standing   2x10 2x10     Hamstring strength         Quad strength         education Provided and reviewed        Ther Activity         Stair negotiation                  Gait Training                           Modalities                             Access Code: Jose De Jesus Fried  URL: [email or web address]  Date: 2023  Prepared by: Spike Ramirez    Exercises  - Supine Hamstring Stretch with Strap  - 1 x daily - 7 x weekly - 3 sets - 10 reps  - Long Sitting Calf Stretch with Strap  - 1 x daily - 7 x weekly - 3 sets - 10 reps  - Long Sitting Quad Set  - 1 x daily - 7 x weekly - 3 sets - 10 reps  - Supine Active Straight Leg Raise  - 1 x daily - 7 x weekly - 3 sets - 10 reps  - Standing Hip Abduction  - 1 x daily - 7 x weekly - 3 sets - 10 reps  - Clamshell with Resistance  - 1 x daily - 7 x weekly - 3 sets - 10 reps  - Supine Bridge  - 1 x daily - 7 x weekly - 3 sets - 10 reps

## 2023-12-15 NOTE — TELEPHONE ENCOUNTER
Attempted to contact April at [facility], requesting cb to verify that they have the correct fax# as POC not rec'd yet.

## 2023-12-15 NOTE — TELEPHONE ENCOUNTER
Genny Mcfarlane MD  You; [facility] days ago       Still havent received thanks     Christian Rene MD; [facility] days ago     BS  Has this bee rec'd to be placed in media and for VS to sign?

## 2023-12-18 ENCOUNTER — OFFICE VISIT (OUTPATIENT)
Dept: PHYSICAL THERAPY | Facility: CLINIC | Age: 53
End: 2023-12-18
Payer: COMMERCIAL

## 2023-12-18 DIAGNOSIS — M17.12 PRIMARY OSTEOARTHRITIS OF LEFT KNEE: Primary | ICD-10-CM

## 2023-12-18 DIAGNOSIS — M17.11 ARTHRITIS OF KNEE, RIGHT: ICD-10-CM

## 2023-12-18 PROCEDURE — 97140 MANUAL THERAPY 1/> REGIONS: CPT

## 2023-12-18 PROCEDURE — 97110 THERAPEUTIC EXERCISES: CPT

## 2023-12-18 NOTE — PROGRESS NOTES
"Daily Note     Today's date: 2023  Patient name: Ca Ortega  : 1970  MRN: [id number]  Referring provider: Melo Scruggs*  Dx:   Encounter Diagnosis     ICD-10-CM    1. Primary osteoarthritis of left knee  M17.12       2. Arthritis of knee, right  M17.11           Start Time: 1600  Stop Time: 1655  Total time in clinic (min): 55 minutes    Subjective: Patient reports her knee felt pretty good this morning when she awoke. Was busy this weekend and overall thinks she had better ROM. Has slight increase in discomfort following busy morning and workout at the gym, then sitting for work for a long time.       Objective: See treatment diary below      Assessment: Tolerated treatment well. Patient demonstrated fatigue post treatment and would benefit from continued PT. NuStep performed c/ purpose of increasing LE muscular endurance and ROM. Encouraged superior patellar glide c/ quad set for correct biomechanics as well as visual cue for correct technique. Required several verbal cues t/o due to compensatory pattern c/ glute contraction v. Quad contraction.       Plan: Continue per plan of care.      Precautions: L knee ACL deficient    Daily Treatment Diary:      Initial Evaluation Date: 23  POC Expiration: 24  Compliance 23             Visit Number 1 2  3  4  5             Re-Eval  IE                 MC   Foto Captured Y                          Manuals     Joint work         -hip         -knee ER rot mob terminal ext NV        ST work Retrograde L knee Retrograde L knee Retrograde L knee Retrograde L knee Retrograde L knee    Flexibility/PROM   ext ext Ext                      Neuro Re-Ed         balance                           Step-ups fwd         Step-ups lat                  lunge         Ther Ex         NuStep/bike   Nu 6' Nu 6' Nu 6'     Ham stretch 5x30\" 5x 30\" 5x 30\" 4x30\" 5/30\"    Gastroc stretch 5x30\" 5x30\" 5x30\" " "long sit 5x30\" long sit 5/30\" Long Sit    Quad sets x20 x20 x20 5\"x20 5\"/20             SLR program x12 X12  2x8 2x8 2/8    clamshells   S/l x10, x10 rtb S/l RTB  5\"x20 S/l RTB 5\"/20    bridges   2x10 3\" 2x10 3\" 2/10 3\"    Leg press         hip abd standing   2x10 2x10 2/10    Hamstring strength         Quad strength         education Provided and reviewed        Ther Activity         Stair negotiation                  Gait Training                           Modalities                             Access Code: [email or web address]  URL: [email or web address]  Date: 12/05/2023  Prepared by: Spike Ramirez    Exercises  - Supine Hamstring Stretch with Strap  - 1 x daily - 7 x weekly - 3 sets - 10 reps  - Long Sitting Calf Stretch with Strap  - 1 x daily - 7 x weekly - 3 sets - 10 reps  - Long Sitting Quad Set  - 1 x daily - 7 x weekly - 3 sets - 10 reps  - Supine Active Straight Leg Raise  - 1 x daily - 7 x weekly - 3 sets - 10 reps  - Standing Hip Abduction  - 1 x daily - 7 x weekly - 3 sets - 10 reps  - Clamshell with Resistance  - 1 x daily - 7 x weekly - 3 sets - 10 reps  - Supine Bridge  - 1 x daily - 7 x weekly - 3 sets - 10 reps           "

## 2023-12-20 ENCOUNTER — OFFICE VISIT (OUTPATIENT)
Dept: PHYSICAL THERAPY | Facility: CLINIC | Age: 53
End: 2023-12-20
Payer: COMMERCIAL

## 2023-12-20 DIAGNOSIS — M17.11 ARTHRITIS OF KNEE, RIGHT: ICD-10-CM

## 2023-12-20 DIAGNOSIS — M17.12 PRIMARY OSTEOARTHRITIS OF LEFT KNEE: Primary | ICD-10-CM

## 2023-12-20 PROCEDURE — 97110 THERAPEUTIC EXERCISES: CPT

## 2023-12-20 PROCEDURE — 97140 MANUAL THERAPY 1/> REGIONS: CPT

## 2023-12-20 NOTE — PROGRESS NOTES
"Daily Note     Today's date: 2023  Patient name: Ca Ortega  : 1970  MRN: [id number]  Referring provider: Melo Scruggs*  Dx:   Encounter Diagnosis     ICD-10-CM    1. Primary osteoarthritis of left knee  M17.12       2. Arthritis of knee, right  M17.11           Start Time: 1600  Stop Time: 1650  Total time in clinic (min): 50 minutes    Subjective: Reports less calf tightness today. She is feeling better overall, able to workout with  this morning c/ minimal pain. Used to struggle c/ sled pushes, but now able to complete s/ pain.       Objective: See treatment diary below      Assessment: Tolerated treatment well. Patient demonstrated fatigue post treatment, exhibited good technique with therapeutic exercises, and would benefit from continued PT. Improved quad set technique c/ good superior patellar glide. Still demonstrates considerable extensor lag c/ SLR, but able to maintain for first few reps of both sets this visit.       Plan: Continue per plan of care.      Precautions: L knee ACL deficient    Daily Treatment Diary:      Initial Evaluation Date: 23  POC Expiration: 24  Compliance 23           Visit Number 1 2  3  4  5  6           Re-Eval  IE                 MC   Foto Captured Y                          Manuals    Joint work         -hip         -knee ER rot mob terminal ext NV        ST work Retrograde L knee Retrograde L knee Retrograde L knee Retrograde L knee Retrograde L knee Retrograde L knee   Flexibility/PROM   ext ext Ext Ext                     Neuro Re-Ed         balance                           Step-ups fwd         Step-ups lat                  lunge         Ther Ex         NuStep/bike   Nu 6' Nu 6' Nu 6'  Nu 6'   Ham stretch 5x30\" 5x 30\" 5x 30\" 4x30\" 5/30\" 5/30\"   Gastroc stretch 5x30\" 5x30\" 5x30\" long sit 5x30\" long sit 5/30\" Long Sit 30\"/5 Long Sit   Quad sets x20 x20 x20 " "5\"x20 5\"/20 5\"/20            SLR program x12 X12  2x8 2x8 2/8 2/8   clamshells   S/l x10, x10 rtb S/l RTB  5\"x20 S/l RTB 5\"/20 S/l RTB 5\"/20   bridges   2x10 3\" 2x10 3\" 2/10 3\" 2/10 3\"   Leg press         hip abd standing   2x10 2x10 2/10 2/10   Hamstring strength         Quad strength         education Provided and reviewed        Ther Activity         Stair negotiation                  Gait Training                           Modalities                             Access Code: [email or web address]  URL: [email or web address]  Date: 12/05/2023  Prepared by: Spike Ramirez    Exercises  - Supine Hamstring Stretch with Strap  - 1 x daily - 7 x weekly - 3 sets - 10 reps  - Long Sitting Calf Stretch with Strap  - 1 x daily - 7 x weekly - 3 sets - 10 reps  - Long Sitting Quad Set  - 1 x daily - 7 x weekly - 3 sets - 10 reps  - Supine Active Straight Leg Raise  - 1 x daily - 7 x weekly - 3 sets - 10 reps  - Standing Hip Abduction  - 1 x daily - 7 x weekly - 3 sets - 10 reps  - Clamshell with Resistance  - 1 x daily - 7 x weekly - 3 sets - 10 reps  - Supine Bridge  - 1 x daily - 7 x weekly - 3 sets - 10 reps             "

## 2023-12-26 ENCOUNTER — OFFICE VISIT (OUTPATIENT)
Dept: PHYSICAL THERAPY | Facility: CLINIC | Age: 53
End: 2023-12-26
Payer: COMMERCIAL

## 2023-12-26 DIAGNOSIS — M17.12 PRIMARY OSTEOARTHRITIS OF LEFT KNEE: Primary | ICD-10-CM

## 2023-12-26 DIAGNOSIS — M17.11 ARTHRITIS OF KNEE, RIGHT: ICD-10-CM

## 2023-12-26 PROCEDURE — 97110 THERAPEUTIC EXERCISES: CPT

## 2023-12-26 NOTE — PROGRESS NOTES
"Daily Note     Today's date: 2023  Patient name: Ca Ortega  : 1970  MRN: [id number]  Referring provider: Melo Scruggs*  Dx:   Encounter Diagnosis     ICD-10-CM    1. Primary osteoarthritis of left knee  M17.12       2. Arthritis of knee, right  M17.11                      Subjective: Patient reports knee is getting better just slower than she would like.       Objective: See treatment diary below      Assessment: Ca Ortega tolerated exercises well. Initiated quad and hamstring exercises to improve recruitment. Continued treatment should benefit.        Plan: Continue per plan of care.      Precautions: L knee ACL deficient    Daily Treatment Diary:      Initial Evaluation Date: 23  POC Expiration: 24  Compliance 23         Visit Number 1 2  3  4  5  6  7         Re-Eval  IE                    Foto Captured Y                          Manuals    Joint work         -hip         -knee  NV        ST work  Retrograde L knee Retrograde L knee Retrograde L knee Retrograde L knee Retrograde L knee   Flexibility/PROM Ext  ext ext Ext Ext                     Neuro Re-Ed         balance                           Step-ups fwd         Step-ups lat                  lunge         Ther Ex         NuStep/bike SRC 10'  Nu 6' Nu 6' Nu 6'  Nu 6'   Ham stretch 5x30\" 5x 30\" 5x 30\" 4x30\" 5/30\" 5/30\"   Gastroc stretch 30\"/5 Long Sit 5x30\" 5x30\" long sit 5x30\" long sit 5/30\" Long Sit 30\"/5 Long Sit   Quad sets 5\"x20 x20 x20 5\"x20 5\"/20 5\"/20            SLR program 2x8 X12  2x8 2x8 2/8 2/8   clamshells S/l GTB  5\"x20  S/l x10, x10 rtb S/l RTB  5\"x20 S/l RTB 5\"/20 S/l RTB 5\"/20   bridges 2x10 3\"  2x10 3\" 2x10 3\" 2/10 3\" 2/10 3\"   Leg press         hip abd standing 2x10  2x10 2x10 2/10 2/10   Hamstring strength Gtb 3x10 seated         Quad strength Quad iso 5\"x10         education         Ther Activity         Stair " negotiation                  Gait Training                           Modalities                             Access Code: [email or web address]  URL: [email or web address]  Date: 12/05/2023  Prepared by: Spike Ramirez    Exercises  - Supine Hamstring Stretch with Strap  - 1 x daily - 7 x weekly - 3 sets - 10 reps  - Long Sitting Calf Stretch with Strap  - 1 x daily - 7 x weekly - 3 sets - 10 reps  - Long Sitting Quad Set  - 1 x daily - 7 x weekly - 3 sets - 10 reps  - Supine Active Straight Leg Raise  - 1 x daily - 7 x weekly - 3 sets - 10 reps  - Standing Hip Abduction  - 1 x daily - 7 x weekly - 3 sets - 10 reps  - Clamshell with Resistance  - 1 x daily - 7 x weekly - 3 sets - 10 reps  - Supine Bridge  - 1 x daily - 7 x weekly - 3 sets - 10 reps

## 2023-12-28 ENCOUNTER — OFFICE VISIT (OUTPATIENT)
Dept: PHYSICAL THERAPY | Facility: CLINIC | Age: 53
End: 2023-12-28
Payer: COMMERCIAL

## 2023-12-28 DIAGNOSIS — M17.12 PRIMARY OSTEOARTHRITIS OF LEFT KNEE: Primary | ICD-10-CM

## 2023-12-28 DIAGNOSIS — M17.11 ARTHRITIS OF KNEE, RIGHT: ICD-10-CM

## 2023-12-28 PROCEDURE — 97110 THERAPEUTIC EXERCISES: CPT

## 2023-12-28 NOTE — PROGRESS NOTES
"Daily Note/Discharge Summary     Today's date: 2023  Patient name: Ca Ortega  : 1970  MRN: [id number]  Referring provider: Melo Scruggs*  Dx:   Encounter Diagnosis     ICD-10-CM    1. Primary osteoarthritis of left knee  M17.12       2. Arthritis of knee, right  M17.11                      Subjective: Patient reports with upcomming work schedule she has to self D/C to HEP because she will be working 60 hour weeks for the next 3 months.       Objective: See treatment diary below      Assessment: Ca Ortega has made good progress in PT. She has been issued and we reviewed HEP as below. She appears to have a good understanding and should do well with D/C to HEP.       Plan:  D/C patient to HEP as per PT POC.      Precautions: L knee ACL deficient    Daily Treatment Diary:      Initial Evaluation Date: 23  POC Expiration: 24  Compliance 23       Visit Number 1 2  3  4  5  6  7  8       Re-Eval  IE                 MC   Foto Captured Y                          Manuals    Joint work         -hip         -knee         ST work   Retrograde L knee Retrograde L knee Retrograde L knee Retrograde L knee   Flexibility/PROM Ext  ext ext Ext Ext                     Neuro Re-Ed         balance                           Step-ups fwd         Step-ups lat                  lunge         Ther Ex         NuStep/bike SRC 10' Nu 6' Nu 6' Nu 6' Nu 6'  Nu 6'   Ham stretch 5x30\"  5x 30\" 4x30\" 5/30\" 530\"   Gastroc stretch 30\"/5 Long Sit 30\"/5 Long Sit 5x30\" long sit 5x30\" long sit 5/30\" Long Sit 30\"/5 Long Sit   Quad sets 5\"x20 5\"x20 x20 5\"x20 5\"/20 5\"/20            SLR program 2x8 2x8 2x8 2x8 2/8 2/8   clamshells S/l GTB  5\"x20 S/l GTB  5\"x20 S/l x10, x10 rtb S/l RTB  5\"x20 S/l RTB 5\"/20 S/l RTB 5\"/   bridges 2x10 3\" 2x10 3\" 2x10 3\" 2x10 3\" 210 3\" 210 3\"   Leg press         hip abd standing 2x10 2x10 2x10 2x10 " "2/10 2/10   Hamstring strength Gtb 3x10 seated  Gtb 3x10 seated        Quad strength Quad iso 5\"x10  Quad iso 5\"x10        education         Ther Activity         Stair negotiation                  Gait Training                           Modalities                             Access Code: [email or web address]  URL: [email or web address]  Date: 12/05/2023  Prepared by: Spike Ramirez    Exercises  - Supine Hamstring Stretch with Strap  - 1 x daily - 7 x weekly - 3 sets - 10 reps  - Long Sitting Calf Stretch with Strap  - 1 x daily - 7 x weekly - 3 sets - 10 reps  - Long Sitting Quad Set  - 1 x daily - 7 x weekly - 3 sets - 10 reps  - Supine Active Straight Leg Raise  - 1 x daily - 7 x weekly - 3 sets - 10 reps  - Standing Hip Abduction  - 1 x daily - 7 x weekly - 3 sets - 10 reps  - Clamshell with Resistance  - 1 x daily - 7 x weekly - 3 sets - 10 reps  - Supine Bridge  - 1 x daily - 7 x weekly - 3 sets - 10 reps    Access Code: [email or web address]  URL: [email or web address]  Date: 12/28/2023  Prepared by: Blue Hair    Exercises  - Supine Quad Set  - 1 x daily - 3 x weekly - 1 sets - 20 reps - 5 seconds hold  - Clamshell with Resistance  - 1 x daily - 3 x weekly - 2 sets - 10 reps - 5 seconds hold  - Supine Bridge  - 1 x daily - 7 x weekly - 2 sets - 10 reps - 3 seconds hold  - Standing Hip Abduction with Counter Support  - 1 x daily - 3 x weekly - 2 sets - 10 reps  - Long Sitting Calf Stretch with Strap  - 1 x daily - 7 x weekly - 1 sets - 4 reps - 30 seconds hold  - Seated Hamstring Curl with Anchored Resistance  - 1 x daily - 3 x weekly - 3 sets - 10 reps  - Supine Active Straight Leg Raise  - 1 x daily - 3 x weekly - 2 sets - 8 reps           "

## 2024-05-30 ENCOUNTER — OFFICE VISIT (OUTPATIENT)
Dept: URGENT CARE | Facility: CLINIC | Age: 54
End: 2024-05-30
Payer: COMMERCIAL

## 2024-05-30 ENCOUNTER — APPOINTMENT (OUTPATIENT)
Dept: RADIOLOGY | Facility: CLINIC | Age: 54
End: 2024-05-30
Payer: COMMERCIAL

## 2024-05-30 VITALS
HEIGHT: 62 IN | SYSTOLIC BLOOD PRESSURE: 135 MMHG | TEMPERATURE: 96.2 F | RESPIRATION RATE: 18 BRPM | BODY MASS INDEX: 31.32 KG/M2 | HEART RATE: 91 BPM | OXYGEN SATURATION: 96 % | WEIGHT: 170.2 LBS | DIASTOLIC BLOOD PRESSURE: 87 MMHG

## 2024-05-30 DIAGNOSIS — S39.012A STRAIN OF LUMBAR REGION, INITIAL ENCOUNTER: Primary | ICD-10-CM

## 2024-05-30 DIAGNOSIS — S39.012A STRAIN OF LUMBAR REGION, INITIAL ENCOUNTER: ICD-10-CM

## 2024-05-30 DIAGNOSIS — M43.17 SPONDYLOLISTHESIS AT L5-S1 LEVEL: ICD-10-CM

## 2024-05-30 PROCEDURE — S9083 URGENT CARE CENTER GLOBAL: HCPCS | Performed by: PHYSICIAN ASSISTANT

## 2024-05-30 PROCEDURE — 72100 X-RAY EXAM L-S SPINE 2/3 VWS: CPT

## 2024-05-30 PROCEDURE — G0382 LEV 3 HOSP TYPE B ED VISIT: HCPCS | Performed by: PHYSICIAN ASSISTANT

## 2024-05-30 RX ORDER — METHOCARBAMOL 500 MG/1
500 TABLET, FILM COATED ORAL
Qty: 10 TABLET | Refills: 0 | Status: SHIPPED | OUTPATIENT
Start: 2024-05-30 | End: 2024-06-09

## 2024-05-30 RX ORDER — PREDNISONE 10 MG/1
TABLET ORAL
Qty: 30 TABLET | Refills: 0 | Status: SHIPPED | OUTPATIENT
Start: 2024-05-30

## 2024-05-30 RX ORDER — LIDOCAINE 50 MG/G
1 PATCH TOPICAL DAILY
Qty: 10 PATCH | Refills: 0 | Status: SHIPPED | OUTPATIENT
Start: 2024-05-30

## 2024-05-30 NOTE — PROGRESS NOTES
"[facility]        NAME: Ca Ortega is a 53 y.o. female  : 1970    MRN: [id number]  DATE: May 30, 2024  TIME: 3:22 PM    /87   Pulse 91   Temp (!) 96.2 °F (35.7 °C) (Temporal)   Resp 18   Ht 5' 2\" (1.575 m)   Wt 77.2 kg (170 lb 3.2 oz)   SpO2 96%   BMI 31.13 kg/m²     Assessment and Plan   Strain of lumbar region, initial encounter [S39.012A]  1. Strain of lumbar region, initial encounter  XR spine lumbar 2 or 3 views injury    Ambulatory referral to Orthopedic Surgery    predniSONE 10 mg tablet    methocarbamol (ROBAXIN) 500 mg tablet    lidocaine (Lidoderm) 5 %    Ambulatory referral to Orthopedic Surgery      2. Spondylolisthesis at L5-S1 level  Ambulatory referral to Orthopedic Surgery    predniSONE 10 mg tablet    methocarbamol (ROBAXIN) 500 mg tablet    lidocaine (Lidoderm) 5 %    Ambulatory referral to Orthopedic Surgery            Patient Instructions       Follow up with PCP in 3-5 days.  Proceed to  ER if symptoms worsen.    Chief Complaint     Chief Complaint   Patient presents with    Back Pain     Lower back pain from [employer] garden on           History of Present Illness       Pt with several days mid lower back pain, onset after doing weeding, no other known trauma,     Back Pain        Review of Systems   Review of Systems   Constitutional: Negative.    HENT: Negative.     Eyes: Negative.    Respiratory: Negative.     Cardiovascular: Negative.    Gastrointestinal: Negative.    Endocrine: Negative.    Genitourinary: Negative.    Musculoskeletal:  Positive for back pain.   Allergic/Immunologic: Negative.    Neurological: Negative.    Hematological: Negative.    Psychiatric/Behavioral: Negative.     All other systems reviewed and are negative.        Current Medications       Current Outpatient Medications:     celecoxib (CeleBREX) 200 mg capsule, TAKE 1 CAPSULE (200 MG TOTAL) BY MOUTH 2 (TWO) TIMES A DAY AS NEEDED FOR MODERATE PAIN, Disp: 60 capsule, Rfl: 2    " Cetirizine HCl (ZyrTEC Allergy) 10 MG CAPS, every 24 hours, Disp: , Rfl:     cholecalciferol (VITAMIN D3) 400 units tablet, Take by mouth, Disp: , Rfl:     estradiol (VIVELLE-DOT) 0.075 MG/24HR, Place 1 patch on the skin, Disp: , Rfl:     FEXOFENADINE HCL PO, Take by mouth, Disp: , Rfl:     levothyroxine 25 mcg tablet, TAKE ONE TABLET BY MOUTH 60 MINUTES BEFORE MEAL., Disp: , Rfl:     lidocaine (Lidoderm) 5 %, Apply 1 patch topically over 12 hours daily Remove & Discard patch within 12 hours or as directed by MD, Disp: 10 patch, Rfl: 0    methocarbamol (ROBAXIN) 500 mg tablet, Take 1 tablet (500 mg total) by mouth daily at bedtime for 10 days, Disp: 10 tablet, Rfl: 0    MULTIPLE VITAMIN PO, Take 1 tablet by mouth daily, Disp: , Rfl:     predniSONE 10 mg tablet, 5 tabs po qd x 2 days then 4 tabs po qd x 2 days then 3 tabs po qd x 2 days then 2 tabs po qd x 2 days then 1 tab po qd x 2 days, Disp: 30 tablet, Rfl: 0    PSEUDOEPHEDRINE HCL PO, Take by mouth, Disp: , Rfl:     Turmeric (QC TUMERIC COMPLEX PO), Take by mouth, Disp: , Rfl:     ASCORBIC ACID PO, Take by mouth (Patient not taking: Reported on 12/21/2022), Disp: , Rfl:     Beclomethasone Dipropionate (QVAR IN), , Disp: , Rfl:     guaiFENesin (MUCINEX) 600 mg 12 hr tablet, Take 1,200 mg by mouth (Patient not taking: Reported on 5/30/2024), Disp: , Rfl:     loratadine (CLARITIN) 10 mg tablet, Take 10 mg by mouth daily (Patient not taking: Reported on 12/21/2022), Disp: , Rfl:     montelukast (SINGULAIR) 10 mg tablet, Take 10 mg by mouth (Patient not taking: Reported on 12/21/2022), Disp: , Rfl:     MULTIPLE VITAMIN PO, Take 1 capsule by mouth daily (Patient not taking: Reported on 12/21/2022), Disp: , Rfl:     Multiple Vitamins-Minerals (EMERGEN-C BLUE PO), Take by mouth (Patient not taking: Reported on 1/27/2023), Disp: , Rfl:     multivitamin (THERAGRAN) TABS, Take by mouth (Patient not taking: Reported on 1/27/2023), Disp: , Rfl:     Current Allergies  "    Allergies as of 2024 - Reviewed 2024   Allergen Reaction Noted    Molds & smuts Other (See Comments) 2013            The following portions of the patient's history were reviewed and updated as appropriate: allergies, current medications, past family history, past medical history, past social history, past surgical history and problem list.     Past Medical History:   Diagnosis Date    Allergies     Thyroid disease        Past Surgical History:   Procedure Laterality Date     SECTION       SECTION         Family History   Problem Relation Age of Onset    Hypertension Mother     Lung cancer Father          Medications have been verified.        Objective   /87   Pulse 91   Temp (!) 96.2 °F (35.7 °C) (Temporal)   Resp 18   Ht 5' 2\" (1.575 m)   Wt 77.2 kg (170 lb 3.2 oz)   SpO2 96%   BMI 31.13 kg/m²        Physical Exam     Physical Exam  Vitals and nursing note reviewed.   Constitutional:       Appearance: Normal appearance. She is normal weight.   HENT:      Head: Normocephalic and atraumatic.   Cardiovascular:      Rate and Rhythm: Normal rate and regular rhythm.      Pulses: Normal pulses.      Heart sounds: Normal heart sounds.   Pulmonary:      Effort: Pulmonary effort is normal.      Breath sounds: Normal breath sounds.   Abdominal:      Palpations: Abdomen is soft.   Musculoskeletal:      Comments: Lumbar and paraspinal tenderness, right sacroiliac tenderness,  no sciatic notch pain, great toe ext strong no paresthesia    Neurological:      Mental Status: She is alert and oriented to person, place, and time.                     "

## 2024-06-07 DIAGNOSIS — M47.816 LUMBAR SPONDYLOSIS: ICD-10-CM

## 2024-06-07 DIAGNOSIS — M53.3 SACROILIAC JOINT DYSFUNCTION OF BOTH SIDES: ICD-10-CM

## 2024-06-07 RX ORDER — CELECOXIB 200 MG/1
200 CAPSULE ORAL 2 TIMES DAILY PRN
Qty: 60 CAPSULE | Refills: 2 | Status: SHIPPED | OUTPATIENT
Start: 2024-06-07

## 2024-07-23 ENCOUNTER — EVALUATION (OUTPATIENT)
Dept: PHYSICAL THERAPY | Facility: CLINIC | Age: 54
End: 2024-07-23
Payer: COMMERCIAL

## 2024-07-23 DIAGNOSIS — G89.29 CHRONIC PAIN OF LEFT KNEE: Primary | ICD-10-CM

## 2024-07-23 DIAGNOSIS — M25.562 CHRONIC PAIN OF LEFT KNEE: Primary | ICD-10-CM

## 2024-07-23 PROCEDURE — 97161 PT EVAL LOW COMPLEX 20 MIN: CPT | Performed by: PHYSICAL THERAPIST

## 2024-07-23 PROCEDURE — 97110 THERAPEUTIC EXERCISES: CPT | Performed by: PHYSICAL THERAPIST

## 2024-07-23 NOTE — LETTER
2024    Lupillo Prieto MD  [address] [facility] [address]    Patient: Ca Ortega   YOB: 1970   Date of Visit: 2024     Encounter Diagnosis     ICD-10-CM    1. Chronic pain of left knee  M25.562     G89.29           Dear Dr. Prieto:    Thank you for your recent referral of Ca Ortega. Please review the attached evaluation summary from Ca's recent visit.     Please verify that you agree with the plan of care by signing the attached order.     If you have any questions or concerns, please do not hesitate to call.     I sincerely appreciate the opportunity to share in the care of one of your patients and hope to have another opportunity to work with you in the near future.       Sincerely,    Spike Ramirez, PT      Referring Provider:      I certify that I have read the below Plan of Care and certify the need for these services furnished under this plan of treatment while under my care.                    Lupillo Prieto MD  [address] [facility] [address]  Via Fax: [phone]          PT Evaluation     Today's date: 2024  Patient name: Ca Ortega  : 1970  MRN: [id number]  Referring provider: Lupillo Prieto MD  Dx:   Encounter Diagnosis     ICD-10-CM    1. Chronic pain of left knee  M25.562     G89.29                      Assessment  Symptom irritability: moderate    Assessment details: Pt presents to PT for pre-op visit for L TKR scheduled 24. She is active but knee limits her activity level and mobility. We reviewed pre-op HEP, answered questions, discussed home prep, post-op expectations. Reviewed proper stair negotiation. She feels comfortable with holding any further PT until post-op.   Understanding of Dx/Px/POC: good     Prognosis: good    Goals  Pre-op visit:  Discuss home set-up, pre-op HEP, post-op expectations    Plan  Patient would benefit from: skilled physical therapy  Planned modality interventions: cryotherapy and  thermotherapy: hydrocollator packs    Planned therapy interventions: manual therapy, neuromuscular re-education, self care, therapeutic activities, therapeutic exercise, home exercise program and gait training    Frequency: 1x week  Duration in weeks: 1  Plan of Care beginning date: 7/23/2024  Plan of Care expiration date: 7/23/2024  Treatment plan discussed with: patient  Plan details: TE, NMR, TA, MT, self education, and modalities as needed in order to progress through skilled PT focused on  ROM, strength, balance, coordination           Subjective Evaluation    History of Present Illness  Mechanism of injury: 53 year old female presenting to PT with L knee pain. She is scheduled for TKR 8/13/24 with Dr. Prieto.         Objective    Antalgic gait pattern, L LE WB    L knee ROM: 0-125    Quad set: strong    Hip flex: 4+/5  Hip abd: 4/5    Some swelling along L jt line noted         Precautions: L pre-op TKR    Daily Treatment Diary:      Initial Evaluation Date: 07/23/24  POC Expiration: 7/23/24  Compliance 07/23/24                     Visit Number 1                    Re-Eval  IE                 MONALISA   Foto Captured Y                            Manuals 7/23        Joint work         -hip         -knee         ST work         Flexibility/PROM                           Neuro Re-Ed         balance                           Step-ups fwd         Step-ups lat                  lunge         Ther Ex         NuStep/bike         Quad sets         Heel slides         SLR program         manuel beaulieu         Leg press         Hamstring strength         Quad strength         education Provided written HEP (below) and reviewed. Spent time discussing pre-op home prep and HEP, post-op expectations. Answered questions        Ther Activity         Stair negotiation                  Gait Training                           Modalities                                 Access Code: [email or web address]  URL:  [email or web address]  Date: 07/23/2024  Prepared by: Spike Ramirez    Exercises  - Supine Bridge  - 1 x daily - 7 x weekly - 3 sets - 10 reps  - Supine Active Straight Leg Raise  - 1 x daily - 7 x weekly - 3 sets - 10 reps  - Supine Quad Set  - 1 x daily - 7 x weekly - 3 sets - 10 reps  - Supine Ankle Pumps  - 1 x daily - 7 x weekly - 3 sets - 10 reps  - Prone Hip Extension on Table  - 1 x daily - 7 x weekly - 3 sets - 10 reps

## 2024-07-23 NOTE — PROGRESS NOTES
PT Evaluation     Today's date: 2024  Patient name: Ca Ortega  : 1970  MRN: [id number]  Referring provider: Lupillo Prieto MD  Dx:   Encounter Diagnosis     ICD-10-CM    1. Chronic pain of left knee  M25.562     G89.29                      Assessment  Symptom irritability: moderate    Assessment details: Pt presents to PT for pre-op visit for L TKR scheduled 24. She is active but knee limits her activity level and mobility. We reviewed pre-op HEP, answered questions, discussed home prep, post-op expectations. Reviewed proper stair negotiation. She feels comfortable with holding any further PT until post-op.   Understanding of Dx/Px/POC: good     Prognosis: good    Goals  Pre-op visit:  Discuss home set-up, pre-op HEP, post-op expectations    Plan  Patient would benefit from: skilled physical therapy  Planned modality interventions: cryotherapy and thermotherapy: hydrocollator packs    Planned therapy interventions: manual therapy, neuromuscular re-education, self care, therapeutic activities, therapeutic exercise, home exercise program and gait training    Frequency: 1x week  Duration in weeks: 1  Plan of Care beginning date: 2024  Plan of Care expiration date: 2024  Treatment plan discussed with: patient  Plan details: TE, NMR, TA, MT, self education, and modalities as needed in order to progress through skilled PT focused on  ROM, strength, balance, coordination           Subjective Evaluation    History of Present Illness  Mechanism of injury: 53 year old female presenting to PT with L knee pain. She is scheduled for TKR 24 with Dr. Prieto.         Objective    Antalgic gait pattern, L LE WB    L knee ROM: 0-125    Quad set: strong    Hip flex: 4+/5  Hip abd: 4/5    Some swelling along L jt line noted         Precautions: L pre-op TKR    Daily Treatment Diary:      Initial Evaluation Date: 24  POC Expiration: 24  Compliance 24                     Visit Number 1                     Re-Eval  IE                 MC   Foto Captured Y                            Manuals 7/23        Joint work         -hip         -knee         ST work         Flexibility/PROM                           Neuro Re-Ed         balance                           Step-ups fwd         Step-ups lat                  lunge         Ther Ex         NuStep/bike         Quad sets         Heel slides         SLR program         clamshells         bridges         Leg press         Hamstring strength         Quad strength         education Provided written HEP (below) and reviewed. Spent time discussing pre-op home prep and HEP, post-op expectations. Answered questions        Ther Activity         Stair negotiation                  Gait Training                           Modalities                                 Access Code: [email or web address]  URL: [email or web address]  Date: 07/23/2024  Prepared by: Spike Ramirez    Exercises  - Supine Bridge  - 1 x daily - 7 x weekly - 3 sets - 10 reps  - Supine Active Straight Leg Raise  - 1 x daily - 7 x weekly - 3 sets - 10 reps  - Supine Quad Set  - 1 x daily - 7 x weekly - 3 sets - 10 reps  - Supine Ankle Pumps  - 1 x daily - 7 x weekly - 3 sets - 10 reps  - Prone Hip Extension on Table  - 1 x daily - 7 x weekly - 3 sets - 10 reps

## 2024-08-15 ENCOUNTER — OFFICE VISIT (OUTPATIENT)
Dept: PHYSICAL THERAPY | Facility: CLINIC | Age: 54
End: 2024-08-15
Payer: COMMERCIAL

## 2024-08-15 DIAGNOSIS — Z47.89 ORTHOPEDIC AFTERCARE: ICD-10-CM

## 2024-08-15 DIAGNOSIS — G89.29 CHRONIC PAIN OF LEFT KNEE: Primary | ICD-10-CM

## 2024-08-15 DIAGNOSIS — M25.562 CHRONIC PAIN OF LEFT KNEE: Primary | ICD-10-CM

## 2024-08-15 PROCEDURE — 97164 PT RE-EVAL EST PLAN CARE: CPT | Performed by: PHYSICAL THERAPIST

## 2024-08-15 PROCEDURE — 97110 THERAPEUTIC EXERCISES: CPT | Performed by: PHYSICAL THERAPIST

## 2024-08-15 NOTE — PROGRESS NOTES
PT Re-Evaluation     Today's date: 8/15/2024  Patient name: Ca Ortega  : 1970  MRN: [id number]  Referring provider: Lupillo Prieto MD  Dx:   Encounter Diagnosis     ICD-10-CM    1. Chronic pain of left knee  M25.562     G89.29       2. Orthopedic aftercare  Z47.89             Start Time: 1515  Stop Time: 1615  Total time in clinic (min): 60 minutes    Patient treated by HAROLDO Elena under the direct supervision of Spike Ramirez, PT, DPT.     Assessment  Symptom irritability: moderate    Assessment details: Pt presents to PT today s/p 2 days post-op L TKE. Ambulating with a RW and demonstrates an antalgic gait. Patient shows minimal quad set and is unable to complete a SLR without max PT assist. Incision does not have any openings or active discharge. Patient is appropriate for skilled therapy to decrease swelling and pain and increasing strength and mobility. Reviewed HEP and post-surgery care at home.   Understanding of Dx/Px/POC: good     Prognosis: good    Goals  ST-4 weeks  Able to complete SLR without PT assist to transition to SPC  Knee flexion to 100 deg by week 4  Independent with HEP for swelling control, mobility and quad activation    LT-8 weeks  Ambulate without AD  Quad and hamstring strength minimum 4/5  Independent with home strengthening    Plan  Patient would benefit from: skilled physical therapy  Planned modality interventions: cryotherapy and thermotherapy: hydrocollator packs    Planned therapy interventions: manual therapy, neuromuscular re-education, self care, therapeutic activities, therapeutic exercise, home exercise program and gait training    Frequency: 2-3x week  Duration in weeks: 12  Plan of Care beginning date: 8/15/2024  Plan of Care expiration date: 2024  Treatment plan discussed with: patient  Plan details: TE, NMR, TA, MT, self education, and modalities as needed in order to progress through skilled PT focused on  ROM, strength, balance, coordination  "            Subjective Evaluation    History of Present Illness  Mechanism of injury: 53 year old female presenting to PT 2 days s/p L TKR with Dr. Prieto. She presents today with edema of the L LE. Notes that pain is intermittent but not severe.     Denies having calf pain or fever. Incision does not have any active discharge or openings. No signs of cellulitis or infection.   Patient Goals  Patient goals for therapy: increased strength, independence with ADLs/IADLs, return to sport/leisure activities, decreased pain, decreased edema and increased motion    Pain  At worst pain ratin    Treatments  Current treatment: physical therapy      Objective    Observation: incision - no drainage or present redness noted    Antalgic gait pattern, L LE WB - step through pattern    L knee ROM: 10-80    Quad set: minimal, unable to complete SLR without max assist    moderate swelling along L jt line noted    No tenderness in calf, soft muscular feel  Kelli's (-)    Flowsheet Rows      Flowsheet Row Most Recent Value   PT/OT G-Codes    Current Score 38   Projected Score 71               Precautions: L post-op TKR 24    Daily Treatment Diary:      Initial Evaluation Date: 08/15/24  POC Expiration: 24  Compliance 08/15/24                     Visit Number 1                    Re-Eval  IE                 MC   Foto Captured Y                            Manuals 7/23 8/15       Joint work         -hip         -knee         ST work         Flexibility/PROM                           Neuro Re-Ed         balance                           Step-ups fwd         Step-ups lat                  lunge         Ther Ex         NuStep/bike         Quad sets  4x5       Heel slides         SLR program  Flex 4x5 max assist       clamshells         bridges         Standing heel raise  x20       TKE   X20no band       Quad strength         Gastroc stretch  4x20\"       AAROM flexion  x20       education  Provided written HEP (below) and " reviewed. Spent time discussing HEP and post-op expectations. Answered questions                Ther Activity         Stair negotiation                  Gait Training                           Modalities         Ice   10 min                       Access Code: [email or web address]  URL: [email or web address]  Date: 07/23/2024  Prepared by: Spike Raimrez    Exercises  - Supine Bridge  - 1 x daily - 7 x weekly - 3 sets - 10 reps  - Supine Active Straight Leg Raise  - 1 x daily - 7 x weekly - 3 sets - 10 reps  - Supine Quad Set  - 1 x daily - 7 x weekly - 3 sets - 10 reps  - Supine Ankle Pumps  - 1 x daily - 7 x weekly - 3 sets - 10 reps  - Prone Hip Extension on Table  - 1 x daily - 7 x weekly - 3 sets - 10 reps

## 2024-08-15 NOTE — LETTER
August 15, 2024    Lupillo Prieto MD  [address] [facility] [address]    Patient: Ca Ortega   YOB: 1970   Date of Visit: 8/15/2024     Encounter Diagnosis     ICD-10-CM    1. Chronic pain of left knee  M25.562     G89.29       2. Orthopedic aftercare  Z47.89           Dear Dr. Prieto:    Thank you for your recent referral of Ca Ortega. Please review the attached evaluation summary from Ca's recent visit.     Please verify that you agree with the plan of care by signing the attached order.     If you have any questions or concerns, please do not hesitate to call.     I sincerely appreciate the opportunity to share in the care of one of your patients and hope to have another opportunity to work with you in the near future.       Sincerely,    Spike Ramirez, PT      Referring Provider:      I certify that I have read the below Plan of Care and certify the need for these services furnished under this plan of treatment while under my care.                    Lupillo Prieto MD  [address] [place] [id number]  Via Fax: [phone]          PT Re-Evaluation     Today's date: 8/15/2024  Patient name: Ca Ortega  : 1970  MRN: [id number]  Referring provider: Lupillo Prieto MD  Dx:   Encounter Diagnosis     ICD-10-CM    1. Chronic pain of left knee  M25.562     G89.29       2. Orthopedic aftercare  Z47.89             Start Time: 1515  Stop Time: 1615  Total time in clinic (min): 60 minutes    Patient treated by HAROLDO Elena under the direct supervision of Spike Ramirez PT, DPT.     Assessment  Symptom irritability: moderate    Assessment details: Pt presents to PT today s/p 2 days post-op L TKE. Ambulating with a RW and demonstrates an antalgic gait. Patient shows minimal quad set and is unable to complete a SLR without max PT assist. Incision does not have any openings or active discharge. Patient is appropriate for skilled therapy to decrease swelling  and pain and increasing strength and mobility. Reviewed HEP and post-surgery care at home.   Understanding of Dx/Px/POC: good     Prognosis: good    Goals  ST-4 weeks  Able to complete SLR without PT assist to transition to SPC  Knee flexion to 100 deg by week 4  Independent with HEP for swelling control, mobility and quad activation    LT-8 weeks  Ambulate without AD  Quad and hamstring strength minimum 4/5  Independent with home strengthening    Plan  Patient would benefit from: skilled physical therapy  Planned modality interventions: cryotherapy and thermotherapy: hydrocollator packs    Planned therapy interventions: manual therapy, neuromuscular re-education, self care, therapeutic activities, therapeutic exercise, home exercise program and gait training    Frequency: 2-3x week  Duration in weeks: 12  Plan of Care beginning date: 8/15/2024  Plan of Care expiration date: 2024  Treatment plan discussed with: patient  Plan details: TE, NMR, TA, MT, self education, and modalities as needed in order to progress through skilled PT focused on  ROM, strength, balance, coordination             Subjective Evaluation    History of Present Illness  Mechanism of injury: 53 year old female presenting to PT 2 days s/p L TKR with Dr. Prieto. She presents today with edema of the L LE. Notes that pain is intermittent but not severe.     Denies having calf pain or fever. Incision does not have any active discharge or openings. No signs of cellulitis or infection.   Patient Goals  Patient goals for therapy: increased strength, independence with ADLs/IADLs, return to sport/leisure activities, decreased pain, decreased edema and increased motion    Pain  At worst pain ratin    Treatments  Current treatment: physical therapy      Objective    Observation: incision - no drainage or present redness noted    Antalgic gait pattern, L LE WB - step through pattern    L knee ROM: 10-80    Quad set: minimal, unable to complete  "SLR without max assist    moderate swelling along L jt line noted    No tenderness in calf, soft muscular feel  Kelli's (-)    Flowsheet Rows      Flowsheet Row Most Recent Value   PT/OT G-Codes    Current Score 38   Projected Score 71               Precautions: L post-op TKR 8/13/24    Daily Treatment Diary:      Initial Evaluation Date: 08/15/24  POC Expiration: 11/7/24  Compliance 08/15/24                     Visit Number 1                    Re-Eval  IE                 MC   Foto Captured Y                            Manuals 7/23 8/15       Joint work         -hip         -knee         ST work         Flexibility/PROM                           Neuro Re-Ed         balance                           Step-ups fwd         Step-ups lat                  lunge         Ther Ex         NuStep/bike         Quad sets  4x5       Heel slides         SLR program  Flex 4x5 max assist       clamshells         bridges         Standing heel raise  x20       TKE   X20no band       Quad strength         Gastroc stretch  4x20\"       AAROM flexion  x20       education  Provided written HEP (below) and reviewed. Spent time discussing HEP and post-op expectations. Answered questions                Ther Activity         Stair negotiation                  Gait Training                           Modalities         Ice   10 min                       Access Code: [email or web address]  URL: [email or web address]  Date: 07/23/2024  Prepared by: Spike Ramirez    Exercises  - Supine Bridge  - 1 x daily - 7 x weekly - 3 sets - 10 reps  - Supine Active Straight Leg Raise  - 1 x daily - 7 x weekly - 3 sets - 10 reps  - Supine Quad Set  - 1 x daily - 7 x weekly - 3 sets - 10 reps  - Supine Ankle Pumps  - 1 x daily - 7 x weekly - 3 sets - 10 reps  - Prone Hip Extension on Table  - 1 x daily - 7 x weekly - 3 sets - 10 reps                  " appropriate continuation and progression of care and I reviewed the documentation.

## 2024-08-19 ENCOUNTER — OFFICE VISIT (OUTPATIENT)
Dept: PHYSICAL THERAPY | Facility: CLINIC | Age: 54
End: 2024-08-19
Payer: COMMERCIAL

## 2024-08-19 DIAGNOSIS — Z47.89 ORTHOPEDIC AFTERCARE: ICD-10-CM

## 2024-08-19 DIAGNOSIS — M25.562 CHRONIC PAIN OF LEFT KNEE: Primary | ICD-10-CM

## 2024-08-19 DIAGNOSIS — G89.29 CHRONIC PAIN OF LEFT KNEE: Primary | ICD-10-CM

## 2024-08-19 PROCEDURE — 97110 THERAPEUTIC EXERCISES: CPT | Performed by: PHYSICAL THERAPIST

## 2024-08-19 NOTE — PROGRESS NOTES
Daily Note     Today's date: 2024  Patient name: Ca Ortega  : 1970  MRN: [id number]  Referring provider: Lupillo Prieto MD  Dx:   Encounter Diagnosis     ICD-10-CM    1. Chronic pain of left knee  M25.562     G89.29       2. Orthopedic aftercare  Z47.89           Start Time: 1440  Stop Time: 1515  Total time in clinic (min): 35 minutes    Patient treated by HAROLDO Elena under the direct supervision of Spike Ramirez, PT, DPT.     Subjective: Reports that during the day her knee feels okay. Does get pain at night that has been making sleep difficult. Reports having what feel like muscle spasms in her leg during the night, is able to relieve this by getting up and walking. Did have to take an OxyContin for the pain last night. Is getting a muscle relaxer after discussing on phone with MD office today. Follow up on .       Objective: See treatment diary below      Assessment: Patient is demonstrating improved quad set. Was able to complete SAQ with mod PT assist.  Improved extension today. Tolerated treatment well. Patient demonstrated fatigue post treatment and would benefit from continued PT      Plan: Continue per plan of care.      Precautions: L post-op TKR 24    Daily Treatment Diary:      Initial Evaluation Date: 08/15/24  POC Expiration: 24  Compliance 08/15/24  8/19                   Visit Number 1 2                   Re-Eval  IE                 MC   Foto Captured Y                            Manuals 7/23 8/15 8/19      Joint work         -hip         -knee         ST work         Flexibility/PROM                           Neuro Re-Ed         balance                           Step-ups fwd         Step-ups lat                  lunge         Ther Ex         NuStep/bike         Quad sets  4x5 5x5      Heel slides         SLR program  Flex 4x5 max assist Flex 4x5 max assist      clamshells         SAQ   4x5 mod assist      Standing heel raise  x20 x20      TKE   X20no band X20 no  "band      Wall gastroc stretch   3x20\"       Gastroc stretch  4x20\" 4x20\"      AAROM flexion  x20 x20      education  Provided written HEP (below) and reviewed. Spent time discussing HEP and post-op expectations. Answered questions                Ther Activity         Stair negotiation                  Gait Training                           Modalities         Ice   10 min  10 min                       "

## 2024-08-21 ENCOUNTER — OFFICE VISIT (OUTPATIENT)
Dept: PHYSICAL THERAPY | Facility: CLINIC | Age: 54
End: 2024-08-21
Payer: COMMERCIAL

## 2024-08-21 DIAGNOSIS — G89.29 CHRONIC PAIN OF LEFT KNEE: Primary | ICD-10-CM

## 2024-08-21 DIAGNOSIS — M25.562 CHRONIC PAIN OF LEFT KNEE: Primary | ICD-10-CM

## 2024-08-21 DIAGNOSIS — Z47.89 ORTHOPEDIC AFTERCARE: ICD-10-CM

## 2024-08-21 PROCEDURE — 97110 THERAPEUTIC EXERCISES: CPT | Performed by: PHYSICAL THERAPIST

## 2024-08-21 NOTE — PROGRESS NOTES
"Daily Note     Today's date: 2024  Patient name: Ca Ortega  : 1970  MRN: [id number]  Referring provider: Lupillo Prieto MD  Dx:   Encounter Diagnosis     ICD-10-CM    1. Chronic pain of left knee  M25.562     G89.29       2. Orthopedic aftercare  Z47.89           Start Time: 1515  Stop Time: 1610  Total time in clinic (min): 55 minutes    Patient treated by HAROLDO Elena under the direct supervision of Spike Ramirez, PT, DPT.     Subjective: Notes that she is still having issues sleeping. Had soreness following session on Monday. Pain is not bad today.       Objective: Passive flex of L knee approx. 90 deg      Assessment: Able to complete SLR without PT assist, extensor lag still present. Quad set continues to improve. SAQ continues to require mod PT assist, slight pain during this at end range extension. Tolerated treatment well. Patient demonstrated fatigue post treatment and would benefit from continued PT      Plan: Continue per plan of care.      Precautions: L post-op TKR 24    Daily Treatment Diary:      Initial Evaluation Date: 08/15/24  POC Expiration: 24  Compliance 08/15/24  8/19  8/21                 Visit Number 1 2  3                 Re-Eval  IE                 MC   Foto Captured Y                            Manuals 7/23 8/15 8/19 8/21     Joint work         -hip         -knee         ST work         Flexibility/PROM                           Neuro Re-Ed         balance                           Step-ups fwd         Step-ups lat                  lunge         Ther Ex         NuStep/bike    5' L1     Quad sets  4x5 5x5 5x6     Heel slides         SLR program  Flex 4x5 max assist Flex 4x5 max assist Flex 4x5 min assist      clamshells         SAQ   4x5 mod assist 3x8 mod assist     Standing heel raise  x20 x20      TKE   X20no band X20 no band      Wall gastroc stretch   3x20\"       Gastroc stretch  4x20\" 4x20\" 4x20\"     AAROM flexion  x20 x20 x20     Heel slides    x10   "   education  Provided written HEP (below) and reviewed. Spent time discussing HEP and post-op expectations. Answered questions                Ther Activity         Stair negotiation                  Gait Training                           Modalities         Ice   10 min  10 min 10 min

## 2024-08-22 ENCOUNTER — OFFICE VISIT (OUTPATIENT)
Dept: PHYSICAL THERAPY | Facility: CLINIC | Age: 54
End: 2024-08-22
Payer: COMMERCIAL

## 2024-08-22 DIAGNOSIS — M25.562 CHRONIC PAIN OF LEFT KNEE: Primary | ICD-10-CM

## 2024-08-22 DIAGNOSIS — G89.29 CHRONIC PAIN OF LEFT KNEE: Primary | ICD-10-CM

## 2024-08-22 DIAGNOSIS — Z47.89 ORTHOPEDIC AFTERCARE: ICD-10-CM

## 2024-08-22 PROCEDURE — 97110 THERAPEUTIC EXERCISES: CPT | Performed by: PHYSICAL THERAPIST

## 2024-08-22 NOTE — PROGRESS NOTES
Daily Note     Today's date: 2024  Patient name: Ca Ortega  : 1970  MRN: [id number]  Referring provider: Lupillo Prieto MD  Dx:   Encounter Diagnosis     ICD-10-CM    1. Chronic pain of left knee  M25.562     G89.29       2. Orthopedic aftercare  Z47.89           Start Time: 1515  Stop Time: 1610  Total time in clinic (min): 55 minutes    Patient treated by HAROLDO Elena under the direct supervision of Spike Ramirez, PT, DPT.    Subjective: Notes that her knee has been feeling better and the pain has been decreasing. Has occasional pain in the shin. Began taking muscle relaxer as prescribed by MD. Was able to sleep last night.       Objective: L flexion 94 deg      Assessment: Patient remains with extensor lag during SLR and SAQ, approx 5 deg. Does demonstrate good quad set. Patient unable to perform LAQ. Educated patient on transitioning to [facility]. Tolerated treatment well. Patient demonstrated fatigue post treatment and would benefit from continued PT      Plan: Continue per plan of care.      Precautions: L post-op TKR 24    Daily Treatment Diary:      Initial Evaluation Date: 08/15/24  POC Expiration: 24  Compliance 08/15/24  8/19  8/21  8/22               Visit Number 1 2  3  4               Re-Eval  IE                 MC   Foto Captured Y                            Manuals 7/23 8/15 8/19 8/21 8/22    Joint work         -hip         -knee         ST work         Flexibility/PROM                           Neuro Re-Ed         balance                           Step-ups fwd         Step-ups lat                  lunge         Ther Ex         NuStep/bike    5' L1 5' L1    Quad sets  4x5 5x5 5x6 4x8             SLR program  Flex 4x5 max assist Flex 4x5 max assist Flex 4x5 min assist  Flex 4x5 no PT assist (5 deg extensor lag present)    Weight shifting     X15 L foot forward    SAQ   4x5 mod assist 3x8 mod assist 3x8 no PT assist (extensor lag present)    Standing heel raise  x20 x20  x20   "  TKE   X20no band X20 no band  X20 no band    Wall gastroc stretch   3x20\"   3x20\"    Gastroc stretch  4x20\" 4x20\" 4x20\" 4x20\"    AAROM flexion  x20 x20 x20 X20     Heel slides    x10 x15    education  Provided written HEP (below) and reviewed. Spent time discussing HEP and post-op expectations. Answered questions   Educated regarding transitioning to SPC             Ther Activity         Stair negotiation                  Gait Training                           Modalities         Ice   10 min  10 min 10 min 10 min                          "

## 2024-08-27 ENCOUNTER — OFFICE VISIT (OUTPATIENT)
Dept: PHYSICAL THERAPY | Facility: CLINIC | Age: 54
End: 2024-08-27
Payer: COMMERCIAL

## 2024-08-27 DIAGNOSIS — Z47.89 ORTHOPEDIC AFTERCARE: ICD-10-CM

## 2024-08-27 DIAGNOSIS — M25.562 CHRONIC PAIN OF LEFT KNEE: Primary | ICD-10-CM

## 2024-08-27 DIAGNOSIS — G89.29 CHRONIC PAIN OF LEFT KNEE: Primary | ICD-10-CM

## 2024-08-27 PROCEDURE — 97110 THERAPEUTIC EXERCISES: CPT

## 2024-08-27 NOTE — PROGRESS NOTES
"Daily Note     Today's date: 2024  Patient name: Ca Ortega  : 1970  MRN: [id number]  Referring provider: Lupillo Prieto MD  Dx:   Encounter Diagnosis     ICD-10-CM    1. Chronic pain of left knee  M25.562     G89.29       2. Orthopedic aftercare  Z47.89           Start Time: 1515  Stop Time: 1615  Total time in clinic (min): 60 minutes    Patient treated by HAROLDO Elena under the direct supervision of Osirsi Brandon PT, DPT.     Subjective: She is getting frustrated with lack of sleeping. Notes that the muscle relaxers have not been helping as much as she would like. Notes pain in knee is not bad, has intermittent pain in her shin and posterior knee.       Objective: See treatment diary below      Assessment: Patient remains with extensor lag during SLR and SAQ. Closed chain strength initiated to begin loading L LE and increase quad activation. Patient unable to complete LAQ without PT assist to terminal extension. Patient able to complete roughly 50% of LAQ unassisted. Began transition to SPC, educated patient on using walker outside of home and SPC in home. Tolerated treatment well. Patient would benefit from continued PT      Plan: Continue per plan of care.      Precautions: L post-op TKR 24    Daily Treatment Diary:      Initial Evaluation Date: 08/15/24  POC Expiration: 24  Compliance 08/15/24  8/19  8/21  8/22  8/27             Visit Number 1 2  3  4  5             Re-Eval  IE                 MC   Foto Captured Y                            Manuals 7/23 8/15 8/19 8/21 8/22 8/27   Joint work         -hip         -knee         ST work         Flexibility/PROM                           Neuro Re-Ed         balance                           Step-ups fwd      2\" 2x8   Step-ups lat                  lunge         Ther Ex         NuStep/bike    5' L1 5' L1 6' L1   Quad sets  4x5 5x5 5x6 4x8 4x8            SLR program  Flex 4x5 max assist Flex 4x5 max assist Flex 4x5 min assist  Flex 4x5 " "no PT assist (5 deg extensor lag present) Flex 3x8 no PT assist (extensor lag remains)   Weight shifting     X15 L foot forward X15 L foot forward   SAQ   4x5 mod assist 3x8 mod assist 3x8 no PT assist (extensor lag present) 3x8 no PT assist (extensor lag present)   Standing heel raise  x20 x20  x20 x25   TKE   X20no band X20 no band  X20 no band    Wall gastroc stretch   3x20\"   3x20\" 3x20\"   Gastroc stretch  4x20\" 4x20\" 4x20\" 4x20\" 4x20\"   AAROM flexion  x20 x20 x20 X20     Heel slides    x10 x15 x15   Mini squats      2x8   LAQ      X10 PT assisted to terminal extension   education  Provided written HEP (below) and reviewed. Spent time discussing HEP and post-op expectations. Answered questions   Educated regarding transitioning to SPC             Ther Activity         Stair negotiation                  Gait Training               SPC 2'            Modalities         Ice   10 min  10 min 10 min 10 min  10 min                          "

## 2024-08-29 ENCOUNTER — OFFICE VISIT (OUTPATIENT)
Dept: PHYSICAL THERAPY | Facility: CLINIC | Age: 54
End: 2024-08-29
Payer: COMMERCIAL

## 2024-08-29 DIAGNOSIS — M25.562 CHRONIC PAIN OF LEFT KNEE: Primary | ICD-10-CM

## 2024-08-29 DIAGNOSIS — Z47.89 ORTHOPEDIC AFTERCARE: ICD-10-CM

## 2024-08-29 DIAGNOSIS — G89.29 CHRONIC PAIN OF LEFT KNEE: Primary | ICD-10-CM

## 2024-08-29 PROCEDURE — 97110 THERAPEUTIC EXERCISES: CPT

## 2024-08-29 NOTE — PROGRESS NOTES
"Daily Note     Today's date: 2024  Patient name: Ca Ortega  : 1970  MRN: [id number]  Referring provider: Lupillo Prieto MD  Dx:   Encounter Diagnosis     ICD-10-CM    1. Chronic pain of left knee  M25.562     G89.29       2. Orthopedic aftercare  Z47.89                      Subjective: Patient reports still having some difficulty with sleep not related to discomfort but \"restless legs\".       Objective: See treatment diary below      Assessment:Ca Ortega tolerated treatment well. Continues with quad lag with SLR but improving. Continued focus on quad recruitment should benefit.       Plan: Continue per plan of care.      Precautions: L post-op TKR 24    Daily Treatment Diary:      Initial Evaluation Date: 08/15/24  POC Expiration: 24  Compliance 08/15/24  8/19  8/21  8/22  8/27  8/29           Visit Number 1 2  3  4  5  6           Re-Eval  IE                 MC   Foto Captured Y                            Manuals 8/29 8/15 8/19 8/21 8/22 8/27   Joint work         -hip         -knee         ST work         Flexibility/PROM         Retro drainage sj                 Neuro Re-Ed         balance                           Step-ups fwd 2\" 2x8     2\" 2x8   Step-ups lat                  lunge         Ther Ex         NuStep/bike 6' L1   5' L1 5' L1 6' L1   Quad sets 4x8 4x5 5x5 5x6 4x8 4x8            SLR program Flex 3x8 no PT assist (extensor lag remains) Flex 4x5 max assist Flex 4x5 max assist Flex 4x5 min assist  Flex 4x5 no PT assist (5 deg extensor lag present) Flex 3x8 no PT assist (extensor lag remains)   Weight shifting     X15 L foot forward X15 L foot forward   SAQ 3x8 no PT assist (extensor lag present)  4x5 mod assist 3x8 mod assist 3x8 no PT assist (extensor lag present) 3x8 no PT assist (extensor lag present)   Standing heel raise x30 x20 x20  x20 x25   TKE   X20no band X20 no band  X20 no band    Wall gastroc stretch   3x20\"   3x20\" 3x20\"   Gastroc stretch 4x20\" 4x20\" 4x20\" 4x20\" " "4x20\" 4x20\"   AAROM flexion - x20 x20 x20 X20     Heel slides x20   x10 x15 x15   Mini squats 2x8     2x8   LAQ 2x8 no assistance     X10 PT assisted to terminal extension   education  Provided written HEP (below) and reviewed. Spent time discussing HEP and post-op expectations. Answered questions   Educated regarding transitioning to SPC             Ther Activity         Stair negotiation                  Gait Training               SPC 2'            Modalities         Ice  10 min 10 min  10 min 10 min 10 min  10 min                            "

## 2024-09-03 ENCOUNTER — OFFICE VISIT (OUTPATIENT)
Dept: PHYSICAL THERAPY | Facility: CLINIC | Age: 54
End: 2024-09-03
Payer: COMMERCIAL

## 2024-09-03 DIAGNOSIS — G89.29 CHRONIC PAIN OF LEFT KNEE: Primary | ICD-10-CM

## 2024-09-03 DIAGNOSIS — M25.562 CHRONIC PAIN OF LEFT KNEE: Primary | ICD-10-CM

## 2024-09-03 DIAGNOSIS — Z47.89 ORTHOPEDIC AFTERCARE: ICD-10-CM

## 2024-09-03 PROCEDURE — 97140 MANUAL THERAPY 1/> REGIONS: CPT | Performed by: PHYSICAL THERAPIST

## 2024-09-03 PROCEDURE — 97110 THERAPEUTIC EXERCISES: CPT | Performed by: PHYSICAL THERAPIST

## 2024-09-03 NOTE — PROGRESS NOTES
"Daily Note     Today's date: 9/3/2024  Patient name: Ca Ortega  : 1970  MRN: [id number]  Referring provider: Lupillo Prieto MD  Dx:   Encounter Diagnosis     ICD-10-CM    1. Chronic pain of left knee  M25.562     G89.29       2. Orthopedic aftercare  Z47.89           Start Time: 1515  Stop Time: 1605  Total time in clinic (min): 50 minutes    Patient treated by HAROLDO Elena under the direct supervision of Spike Ramirez, PT, DPT.    Subjective: Patient notes that she has been sleeping better since previous visit. Notes that her shin pain has been decreasing as well.       Objective: arom 3-105      Assessment: Patient responded well to retrograde massage noting decreased pain in her shin. Patient remains with extensor lag during SLR. Performed SAQ with PT assist to TKE with iso hold and eccentric portion to initiate greater contraction of quad in TKE position. Tolerated treatment well. Patient would benefit from continued PT      Plan: Continue per plan of care.      Precautions: L post-op TKR 24    Daily Treatment Diary:      Initial Evaluation Date: 08/15/24  POC Expiration: 24  Compliance 08/15/24  8/19  8/21  8/22  8/27  8/29  9/3         Visit Number 1 2  3  4  5  6  7         Re-Eval  IE                 MONALISA   Foto Captured Y                            Manuals  9/3 8/19 8/21 8/22 8/27   Joint work         -hip         -knee         ST work         Flexibility/PROM         Retro drainage sj sj                Neuro Re-Ed         balance                           Step-ups fwd 2\" 2x8 2\" 2x10    2\" 2x8   Step-ups lat                  lunge         Ther Ex         NuStep/bike 6' L1 6' L1  5' L1 5' L1 6' L1   Quad sets 4x8 4x8 5x5 5x6 4x8 4x8            SLR program Flex 3x8 no PT assist (extensor lag remains) Flex 3x8 Flex 4x5 max assist Flex 4x5 min assist  Flex 4x5 no PT assist (5 deg extensor lag present) Flex 3x8 no PT assist (extensor lag remains)   Weight shifting     X15 L foot " "forward X15 L foot forward   SAQ 3x8 no PT assist (extensor lag present) 3x8 PT assist to TKE then hold w/ ecc 4x5 mod assist 3x8 mod assist 3x8 no PT assist (extensor lag present) 3x8 no PT assist (extensor lag present)   Standing heel raise x30 x20 x20  x20 x25   TKE    X20 no band  X20 no band    Wall gastroc stretch  3x20\" 3x20\"   3x20\" 3x20\"   Gastroc stretch 4x20\" 4x20\" 4x20\" 4x20\" 4x20\" 4x20\"   AAROM flexion -  x20 x20 X20     Heel slides x20 x20  x10 x15 x15   Mini squats 2x8 2x10    2x8   LAQ 2x8 no assistance 2x8 no assistance    X10 PT assisted to terminal extension   education     Educated regarding transitioning to SPC             Ther Activity         Stair negotiation                  Gait Training               [facility]'            Modalities         Ice  10 min 10 min  10 min 10 min 10 min  10 min                              "

## 2024-09-05 ENCOUNTER — OFFICE VISIT (OUTPATIENT)
Dept: PHYSICAL THERAPY | Facility: CLINIC | Age: 54
End: 2024-09-05
Payer: COMMERCIAL

## 2024-09-05 DIAGNOSIS — G89.29 CHRONIC PAIN OF LEFT KNEE: Primary | ICD-10-CM

## 2024-09-05 DIAGNOSIS — Z47.89 ORTHOPEDIC AFTERCARE: ICD-10-CM

## 2024-09-05 DIAGNOSIS — M25.562 CHRONIC PAIN OF LEFT KNEE: Primary | ICD-10-CM

## 2024-09-05 PROCEDURE — 97110 THERAPEUTIC EXERCISES: CPT | Performed by: PHYSICAL THERAPIST

## 2024-09-05 NOTE — PROGRESS NOTES
"Daily Note     Today's date: 2024  Patient name: Ca Ortega  : 1970  MRN: [id number]  Referring provider: Lupillo Prieto MD  Dx:   Encounter Diagnosis     ICD-10-CM    1. Chronic pain of left knee  M25.562     G89.29       2. Orthopedic aftercare  Z47.89           Start Time: 1600  Stop Time: 1655  Total time in clinic (min): 55 minutes    Patient treated by HAROLDO Elena under the direct supervision of Spike Ramirez, PT, DPT.    Subjective: Had follow up appointment this morning. Had x-rays taken, no immediate concerns seen on x-rays. Was told she is allowed to start using lotion on area around incision. Overall PA seemed pleased.       Objective: See treatment diary below      Assessment: TKE with t-band added to illicit greater activation of quad. Patient remains with very minor extensor lag during SLR, cuing given during SLR to DF which reduced extensor lag. Patient uses hip hike to complete step up, was able to correct this with visual cuing from mirror. Tolerated treatment well. Patient would benefit from continued PT      Plan: Continue per plan of care.      Precautions: L post-op TKR 24    Daily Treatment Diary:      Initial Evaluation Date: 08/15/24  POC Expiration: 24  Compliance 08/15/24  8/19  8/21  8/22  8/27  8/29  9/3  9/5       Visit Number 1 2  3  4  5  6  7  8       Re-Eval  IE                 MC   Foto Captured Y                            Manuals 8/29 9/3 9   Joint work         -hip         -knee         ST work         Flexibility/PROM         Retro drainage sj sj                Neuro Re-Ed         balance                           Step-ups fwd 2\" 2x8 2\" 2x10 4\" 2x8   2\" 2x8   Step-ups lat                  lunge         Ther Ex         NuStep/bike 6' L1 6' L1 6' L2  Bike nv 5' L1 5' L1 6' L1   Quad sets 4x8 4x8 2x12 5x6 4x8 4x8            SLR program Flex 3x8 no PT assist (extensor lag remains) Flex 3x8 Flex 3x8 Flex 4x5 min assist  Flex 4x5 no PT " "assist (5 deg extensor lag present) Flex 3x8 no PT assist (extensor lag remains)   Weight shifting     X15 L foot forward X15 L foot forward   SAQ 3x8 no PT assist (extensor lag present) 3x8 PT assist to TKE then hold w/ ecc 3x8 PT assist to TKE then hold w/ ecc 3x8 mod assist 3x8 no PT assist (extensor lag present) 3x8 no PT assist (extensor lag present)   Standing heel raise x30 x20 x20  x20 x25   TKE    Rtb 2x10  X20 no band    Wall gastroc stretch  3x20\" 3x20\"   3x20\" 3x20\"   Gastroc stretch 4x20\" 4x20\" 4x20\" 4x20\" 4x20\" 4x20\"   AAROM flexion -   x20 X20     Heel slides x20 x20  x10 x15 x15   Mini squats 2x8 2x10 2x12   2x8   LAQ 2x8 no assistance 2x8 no assistance 2x8 PT asisst to TKE and hold   X10 PT assisted to terminal extension   education     Educated regarding transitioning to [facility] [place]             Ther Activity         Stair negotiation                  Gait Training               [facility] [place] 2'            Modalities         Ice  10 min 10 min  10 min 10 min 10 min  10 min                                "

## 2024-09-10 ENCOUNTER — OFFICE VISIT (OUTPATIENT)
Dept: PHYSICAL THERAPY | Facility: CLINIC | Age: 54
End: 2024-09-10
Payer: COMMERCIAL

## 2024-09-10 DIAGNOSIS — M25.562 CHRONIC PAIN OF LEFT KNEE: Primary | ICD-10-CM

## 2024-09-10 DIAGNOSIS — G89.29 CHRONIC PAIN OF LEFT KNEE: Primary | ICD-10-CM

## 2024-09-10 DIAGNOSIS — Z47.89 ORTHOPEDIC AFTERCARE: ICD-10-CM

## 2024-09-10 PROCEDURE — 97110 THERAPEUTIC EXERCISES: CPT | Performed by: PHYSICAL THERAPIST

## 2024-09-10 PROCEDURE — 97112 NEUROMUSCULAR REEDUCATION: CPT | Performed by: PHYSICAL THERAPIST

## 2024-09-10 NOTE — PROGRESS NOTES
"Daily Note     Today's date: 9/10/2024  Patient name: Ca Ortega  : 1970  MRN: [id number]  Referring provider: Lupillo Prieto MD  Dx:   Encounter Diagnosis     ICD-10-CM    1. Chronic pain of left knee  M25.562     G89.29       2. Orthopedic aftercare  Z47.89                      Subjective: still getting restless leg type discomfort at night. Oxy helps this. Going to set up follow-up with PCP to discuss options      Objective: arom: 2-109      Assessment: Attempted SLS, patient had difficulty with this, transitioned to tandem stance which improved patient tolerance. 6\" step up produced pain the anterior knee, remained with 4\" step. SLR had slight lag, corrected with cuing.Tolerated treatment well. Patient exhibited good technique with therapeutic exercises and would benefit from continued PT      Plan: Continue per plan of care.      Precautions: L post-op TKR 24    Daily Treatment Diary:      Initial Evaluation Date: 08/15/24  POC Expiration: 24  Compliance 08/15/24  8/19  8/21  8/22  8/27  8/29  9/3  9/5  9/10     Visit Number 1 2  3  4  5  6  7  8  9     Re-Eval  IE                 MC   Foto Captured Y                            Manuals 8/29 9/3 9/5 9/10     Joint work         -hip         -knee         ST work         Flexibility/PROM         Retro drainage sj sj                Neuro Re-Ed         balance    Tandem 3x20\" ea                       Step-ups fwd 2\" 2x8 2\" 2x10 4\" 2x8 4\" 2x12     Step-ups lat                  lunge         Ther Ex         NuStep/bike 6' L1 6' L1 6' L2  Bike nv 5' recumbent bike     Quad sets 4x8 4x8 2x12               SLR program Flex 3x8 no PT assist (extensor lag remains) Flex 3x8 Flex 3x8 3x8 slight lag, corrected with cuing     Sidestepping     5x10'     SAQ 3x8 no PT assist (extensor lag present) 3x8 PT assist to TKE then hold w/ ecc 3x8 PT assist to TKE then hold w/ ecc 3x8 1#     Standing heel raise x30 x20 x20      TKE    Rtb 2x10      Wall gastroc " "stretch  3x20\" 3x20\"  3x20\"     Gastroc stretch 4x20\" 4x20\" 4x20\"      AAROM flexion -        Heel slides x20 x20  x20     Mini squats 2x8 2x10 2x12      LAQ 2x8 no assistance 2x8 no assistance 2x8 PT asisst to TKE and hold 3x8 1#     education                  Ther Activity         Stair negotiation                  Gait Training                           Modalities         Ice  10 min 10 min  10 min 10 min                                    "

## 2024-09-12 ENCOUNTER — OFFICE VISIT (OUTPATIENT)
Dept: PHYSICAL THERAPY | Facility: CLINIC | Age: 54
End: 2024-09-12
Payer: COMMERCIAL

## 2024-09-12 DIAGNOSIS — M25.562 CHRONIC PAIN OF LEFT KNEE: Primary | ICD-10-CM

## 2024-09-12 DIAGNOSIS — Z47.89 ORTHOPEDIC AFTERCARE: ICD-10-CM

## 2024-09-12 DIAGNOSIS — G89.29 CHRONIC PAIN OF LEFT KNEE: Primary | ICD-10-CM

## 2024-09-12 PROCEDURE — 97110 THERAPEUTIC EXERCISES: CPT

## 2024-09-12 PROCEDURE — 97112 NEUROMUSCULAR REEDUCATION: CPT

## 2024-09-12 NOTE — PROGRESS NOTES
"Daily Note     Today's date: 2024  Patient name: Ca Ortega  : 1970  MRN: [id number]  Referring provider: Lupillo Prieto MD  Dx:   Encounter Diagnosis     ICD-10-CM    1. Chronic pain of left knee  M25.562     G89.29       2. Orthopedic aftercare  Z47.89                      Subjective: Patient reports knee is feeling really good. Reports driving is not an issue.       Objective: See treatment diary below      Assessment: Ca Ortega tolerated treatment well. Quad strength progressing well. Improved ambulation without assistive device and should be able to ambulate at home without cane but use it when she goes out. Continued treatment indicated.       Plan: Continue per plan of care.      Precautions: L post-op TKR 24    Daily Treatment Diary:      Initial Evaluation Date: 08/15/24  POC Expiration: 24  Compliance 08/15/24  8/19  8/21  8/22  8/27  8/29  9/3  9/5  9/10  9/12   Visit Number 1 2  3  4  5  6  7  8  9  10   Re-Eval  IE                 MC   Foto Captured Y                            Manuals 8/29 9/3 9/5 9/10 9/12    Joint work         -hip         -knee         ST work         Flexibility/PROM         Retro drainage sj sj                Neuro Re-Ed         balance    Tandem 3x20\" ea Tandem 3x20\" ea                      Step-ups fwd 2\" 2x8 2\" 2x10 4\" 2x8 4\" 2x12 4\" 2x12    Step-ups lat                  lunge         Ther Ex         NuStep/bike 6' L1 6' L1 6' L2  Bike nv 5' recumbent bike 5' recumbent bike    Quad sets 4x8 4x8 2x12  2x12             SLR program Flex 3x8 no PT assist (extensor lag remains) Flex 3x8 Flex 3x8 3x8 slight lag, corrected with cuing 3x8 slight lag, corrected with cuing    Sidestepping     5x10' 5x10'    SAQ 3x8 no PT assist (extensor lag present) 3x8 PT assist to TKE then hold w/ ecc 3x8 PT assist to TKE then hold w/ ecc 3x8 1# 3x10 1#    Standing heel raise x30 x20 x20      TKE    Rtb 2x10      Wall gastroc stretch  3x20\" 3x20\"  3x20\" 4x20\"    Gastroc " "stretch 4x20\" 4x20\" 4x20\"      AAROM flexion -        Heel slides x20 x20  x20 X20     Mini squats 2x8 2x10 2x12      LAQ 2x8 no assistance 2x8 no assistance 2x8 PT asisst to TKE and hold 3x8 1# 3x10 1#    education                  Ther Activity         Stair negotiation                  Gait Training                           Modalities         Ice  10 min 10 min  10 min 10 min 10 min                                     "

## 2024-09-17 ENCOUNTER — OFFICE VISIT (OUTPATIENT)
Dept: PHYSICAL THERAPY | Facility: CLINIC | Age: 54
End: 2024-09-17
Payer: COMMERCIAL

## 2024-09-17 DIAGNOSIS — G89.29 CHRONIC PAIN OF LEFT KNEE: Primary | ICD-10-CM

## 2024-09-17 DIAGNOSIS — M25.562 CHRONIC PAIN OF LEFT KNEE: Primary | ICD-10-CM

## 2024-09-17 DIAGNOSIS — Z47.89 ORTHOPEDIC AFTERCARE: ICD-10-CM

## 2024-09-17 PROCEDURE — 97112 NEUROMUSCULAR REEDUCATION: CPT

## 2024-09-17 PROCEDURE — 97110 THERAPEUTIC EXERCISES: CPT

## 2024-09-17 NOTE — PROGRESS NOTES
"Daily Note     Today's date: 2024  Patient name: Ca Ortega  : 1970  MRN: [id number]  Referring provider: Lupillo Prieto MD  Dx:   Encounter Diagnosis     ICD-10-CM    1. Chronic pain of left knee  M25.562     G89.29       2. Orthopedic aftercare  Z47.89           Start Time: 0845  Stop Time: 0940  Total time in clinic (min): 55 minutes    Subjective: Reports she still has trouble sleeping for long periods but was put on gabapentin to help with nerves. Gets pain in area just on top of patella when standing for too long and doing LAQ/SAQs.       Objective: See treatment diary below      Assessment: Tolerated treatment well. Patient demonstrated fatigue post treatment, exhibited good technique with therapeutic exercises, and would benefit from continued PT. Improving knee extension ROM, most limited in the lateral hamstring and calf mm, performed manual hamstring stretching and AAROM ext c/ quad set hold at PROM end range.       Plan: Continue per plan of care.      Precautions: L post-op TKR 24    Daily Treatment Diary:      Initial Evaluation Date: 08/15/24  POC Expiration: 24  Compliance 9/17  8/19  8/21  8/22  8/27  8/29  9/3  9/5  9/10  9/12   Visit Number 11 2  3  4  5  6  7  8  9  10   Re-Eval                   MC   Foto Captured                             Manuals 8/29 9/3 9/5 9/10 9/12 9/17   Joint work         -hip         -knee         ST work         Flexibility/PROM      KS   Retro drainage sj sj                Neuro Re-Ed         balance    Tandem 3x20\" ea Tandem 3x20\" ea Tandem 20\"/3ea                     Step-ups fwd 2\" 2x8 2\" 2x10 4\" 2x8 4\" 2x12 4\" 2x12 4\" 2x12   Step-ups lat                  lunge         Ther Ex         NuStep/bike 6' L1 6' L1 6' L2  Bike nv 5' recumbent bike 5' recumbent bike 5' RB   Quad sets 4x8 4x8 2x12  2x12 2x12            SLR program Flex 3x8 no PT assist (extensor lag remains) Flex 3x8 Flex 3x8 3x8 slight lag, corrected with cuing 3x8 slight lag, " "corrected with cuing 3x8 very slight lag, corrected c/ cues   Sidestepping     5x10' 5x10' 10'/5   SAQ 3x8 no PT assist (extensor lag present) 3x8 PT assist to TKE then hold w/ ecc 3x8 PT assist to TKE then hold w/ ecc 3x8 1# 3x10 1# 3x10 1#    Standing heel raise x30 x20 x20      TKE    Rtb 2x10      Wall gastroc stretch  3x20\" 3x20\"  3x20\" 4x20\" 20\"/4   Gastroc stretch 4x20\" 4x20\" 4x20\"      AAROM flexion -        Heel slides x20 x20  x20 X20  x20   Mini squats 2x8 2x10 2x12      LAQ 2x8 no assistance 2x8 no assistance 2x8 PT asisst to TKE and hold 3x8 1# 3x10 1# 3x10 1#   education      Scar Massage, Desensitization            Ther Activity         Stair negotiation                  Gait Training                           Modalities         Ice  10 min 10 min  10 min 10 min 10 min nv                                      "

## 2024-09-19 ENCOUNTER — EVALUATION (OUTPATIENT)
Dept: PHYSICAL THERAPY | Facility: CLINIC | Age: 54
End: 2024-09-19
Payer: COMMERCIAL

## 2024-09-19 DIAGNOSIS — Z47.89 ORTHOPEDIC AFTERCARE: ICD-10-CM

## 2024-09-19 DIAGNOSIS — G89.29 CHRONIC PAIN OF LEFT KNEE: Primary | ICD-10-CM

## 2024-09-19 DIAGNOSIS — M25.562 CHRONIC PAIN OF LEFT KNEE: Primary | ICD-10-CM

## 2024-09-19 PROCEDURE — 97110 THERAPEUTIC EXERCISES: CPT | Performed by: PHYSICAL THERAPIST

## 2024-09-19 PROCEDURE — 97112 NEUROMUSCULAR REEDUCATION: CPT | Performed by: PHYSICAL THERAPIST

## 2024-09-19 PROCEDURE — 97140 MANUAL THERAPY 1/> REGIONS: CPT | Performed by: PHYSICAL THERAPIST

## 2024-09-19 NOTE — PROGRESS NOTES
PT Re-Evaluation     Today's date: 2024  Patient name: Ca Ortega  : 1970  MRN: [id number]  Referring provider: Lupillo Prieto MD  Dx:   Encounter Diagnosis     ICD-10-CM    1. Chronic pain of left knee  M25.562     G89.29       2. Orthopedic aftercare  Z47.89                        Patient treated by HAROLDO Elena under the direct supervision of Spike Ramirez, PT, DPT.     Assessment  Symptom irritability: low    Assessment details: Pt has shown progress both subjectively and objectively since starting therapy. ROM is doing very well. Main findings today are weakness terminal knee ext, difficulty with high level balance, and challenged with normal stairs. Overall she is progressing well. Would benefit from an additional 2-4  weeks of skilled therapy for progression towards areas noted above. She is in agreement with POC.   Understanding of Dx/Px/POC: good     Prognosis: good    Goals  ST-4 weeks - MET  Able to complete SLR without PT assist to transition to SPC  Knee flexion to 100 deg by week 4  Independent with HEP for swelling control, mobility and quad activation    LT-8 weeks - progressing towards  Ambulate without AD  Quad and hamstring strength minimum 4/5  Independent with home strengthening  Complete stairs reciprocally   SLS 15 sec good control    Plan  Patient would benefit from: skilled physical therapy  Planned modality interventions: cryotherapy and thermotherapy: hydrocollator packs    Planned therapy interventions: manual therapy, neuromuscular re-education, self care, therapeutic activities, therapeutic exercise, home exercise program and gait training    Frequency: 2-3x week  Duration in weeks: 12  Plan of Care beginning date: 8/15/2024  Plan of Care expiration date: 2024  Treatment plan discussed with: patient  Plan details: TE, NMR, TA, MT, self education, and modalities as needed in order to progress through skilled PT focused on  ROM, strength, balance,  "coordination             Subjective Evaluation    History of Present Illness  Mechanism of injury: 53 year old female presenting to PT 2 days s/p L TKR with Dr. Pireto. She presents today with edema of the L LE. Notes that pain is intermittent but not severe.     Denies having calf pain or fever. Incision does not have any active discharge or openings. No signs of cellulitis or infection.   Patient Goals  Patient goals for therapy: increased strength, independence with ADLs/IADLs, return to sport/leisure activities, decreased pain, decreased edema and increased motion    Pain  At worst pain ratin    Treatments  Current treatment: physical therapy      Objective    Observation: slight compensated Trendelenberg gait    L Knee arom: 0-123    L MMT  Knee ext 4+/5 mid range, 3+/5 terminal  Knee flex: 4+/5  Hip flex: 4+/5  Hip abd: 3+/5    Minimal swelling     Precautions: L post-op TKR 24    Daily Treatment Diary:      Initial Evaluation Date: 08/15/24  POC Expiration: 24  Compliance 9/17 9/19        9/10  9/12   Visit Number 11 12        9  10   Re-Eval                   MC   Foto Captured                             Manuals    Joint work         -hip         -knee         ST work         Flexibility/PROM      KS   Retro drainage          assessment        Neuro Re-Ed         balance SLS 8x5\" ea    Tandem 3x20\" ea Tandem 20\"/3ea                     Step-ups fwd 8\" 2x12    4\" 2x12 4\" 2x12   Step-ups up/down 6\" x10                 lunge         Ther Ex         NuStep/bike 6' SRB - ROM    5' recumbent bike 5' RB   Quad sets     2x12 2x12            SLR program     3x8 slight lag, corrected with cuing 3x8 very slight lag, corrected c/ cues   Sidestepping      5x10' 10'/5   SAQ 3x12 2#    3x10 1# 3x10 1#    Standing heel raise 5# db 2x10        TKE          Wall gastroc stretch     4x20\" 20\"/4   Gastroc stretch         AAROM flexion         Heel slides     X20  x20   Mini squats         Leg press " 60# 3x10        rasta Gtb 3x 15 s/l        LAQ 3x10 4#    3x10 1# 3x10 1#   education      Scar Massage, Desensitization            Ther Activity         Stair negotiation                  Gait Training                           Modalities         Ice      10 min nv

## 2024-09-19 NOTE — LETTER
2024    Lupillo Prieto MD  [address] [facility] [address]    Patient: Ca Ortega   YOB: 1970   Date of Visit: 2024     Encounter Diagnosis     ICD-10-CM    1. Chronic pain of left knee  M25.562     G89.29       2. Orthopedic aftercare  Z47.89           Dear Dr. Prieto:    Thank you for your recent referral of Ca Ortega. Please review the attached evaluation summary from Ca's recent visit.     Please verify that you agree with the plan of care by signing the attached order.     If you have any questions or concerns, please do not hesitate to call.     I sincerely appreciate the opportunity to share in the care of one of your patients and hope to have another opportunity to work with you in the near future.       Sincerely,    Spike Ramirez PT      Referring Provider:      I certify that I have read the below Plan of Care and certify the need for these services furnished under this plan of treatment while under my care.                    Lupillo Prieto MD  [address] [facility] [address]  Via Fax: [phone]          PT Re-Evaluation     Today's date: 2024  Patient name: Ca Ortega  : 1970  MRN: [id number]  Referring provider: Lupillo Prieto MD  Dx:   Encounter Diagnosis     ICD-10-CM    1. Chronic pain of left knee  M25.562     G89.29       2. Orthopedic aftercare  Z47.89                        Patient treated by HAROLDO Elena under the direct supervision of Spike Ramirez PT, DPT.     Assessment  Symptom irritability: low    Assessment details: Pt has shown progress both subjectively and objectively since starting therapy. ROM is doing very well. Main findings today are weakness terminal knee ext, difficulty with high level balance, and challenged with normal stairs. Overall she is progressing well. Would benefit from an additional 2-4  weeks of skilled therapy for progression towards areas noted above. She is in  agreement with POC.   Understanding of Dx/Px/POC: good     Prognosis: good    Goals  ST-4 weeks - MET  Able to complete SLR without PT assist to transition to SPC  Knee flexion to 100 deg by week 4  Independent with HEP for swelling control, mobility and quad activation    LT-8 weeks - progressing towards  Ambulate without AD  Quad and hamstring strength minimum 4/5  Independent with home strengthening  Complete stairs reciprocally   SLS 15 sec good control    Plan  Patient would benefit from: skilled physical therapy  Planned modality interventions: cryotherapy and thermotherapy: hydrocollator packs    Planned therapy interventions: manual therapy, neuromuscular re-education, self care, therapeutic activities, therapeutic exercise, home exercise program and gait training    Frequency: 2-3x week  Duration in weeks: 12  Plan of Care beginning date: 8/15/2024  Plan of Care expiration date: 2024  Treatment plan discussed with: patient  Plan details: TE, NMR, TA, MT, self education, and modalities as needed in order to progress through skilled PT focused on  ROM, strength, balance, coordination             Subjective Evaluation    History of Present Illness  Mechanism of injury: 53 year old female presenting to PT 2 days s/p L TKR with Dr. Prieto. She presents today with edema of the L LE. Notes that pain is intermittent but not severe.     Denies having calf pain or fever. Incision does not have any active discharge or openings. No signs of cellulitis or infection.   Patient Goals  Patient goals for therapy: increased strength, independence with ADLs/IADLs, return to sport/leisure activities, decreased pain, decreased edema and increased motion    Pain  At worst pain ratin    Treatments  Current treatment: physical therapy      Objective    Observation: slight compensated Trendelenberg gait    L Knee arom: 0-123    L MMT  Knee ext 4+/5 mid range, 3+/5 terminal  Knee flex: 4+/5  Hip flex: 4+/5  Hip abd:  "3+/5    Minimal swelling     Precautions: L post-op TKR 8/13/24    Daily Treatment Diary:      Initial Evaluation Date: 08/15/24  POC Expiration: 11/7/24  Compliance 9/17 9/19        9/10  9/12   Visit Number 11 12        9  10   Re-Eval                   MC   Foto Captured                             Manuals 9/19 9/12 9/17   Joint work         -hip         -knee         ST work         Flexibility/PROM      KS   Retro drainage          assessment        Neuro Re-Ed         balance SLS 8x5\" ea    Tandem 3x20\" ea Tandem 20\"/3ea                     Step-ups fwd 8\" 2x12    4\" 2x12 4\" 2x12   Step-ups up/down 6\" x10                 lunge         Ther Ex         NuStep/bike 6' SRB - ROM    5' recumbent bike 5' RB   Quad sets     2x12 2x12            SLR program     3x8 slight lag, corrected with cuing 3x8 very slight lag, corrected c/ cues   Sidestepping      5x10' 10'/5   SAQ 3x12 2#    3x10 1# 3x10 1#    Standing heel raise 5# db 2x10        TKE          Wall gastroc stretch     4x20\" 20\"/4   Gastroc stretch         AAROM flexion         Heel slides     X20  x20   Mini squats         Leg press 60# 3x10        clamshell Gtb 3x 15 s/l        LAQ 3x10 4#    3x10 1# 3x10 1#   education      Scar Massage, Desensitization            Ther Activity         Stair negotiation                  Gait Training                           Modalities         Ice      10 min nv                                                        "

## 2024-09-24 ENCOUNTER — OFFICE VISIT (OUTPATIENT)
Dept: PHYSICAL THERAPY | Facility: CLINIC | Age: 54
End: 2024-09-24
Payer: COMMERCIAL

## 2024-09-24 DIAGNOSIS — G89.29 CHRONIC PAIN OF LEFT KNEE: Primary | ICD-10-CM

## 2024-09-24 DIAGNOSIS — Z47.89 ORTHOPEDIC AFTERCARE: ICD-10-CM

## 2024-09-24 DIAGNOSIS — M25.562 CHRONIC PAIN OF LEFT KNEE: Primary | ICD-10-CM

## 2024-09-24 PROCEDURE — 97110 THERAPEUTIC EXERCISES: CPT

## 2024-09-24 PROCEDURE — 97112 NEUROMUSCULAR REEDUCATION: CPT

## 2024-09-24 NOTE — PROGRESS NOTES
"Daily Note     Today's date: 2024  Patient name: Ca Ortega  : 1970  MRN: [id number]  Referring provider: Lupillo Prieto MD  Dx:   Encounter Diagnosis     ICD-10-CM    1. Chronic pain of left knee  M25.562     G89.29       2. Orthopedic aftercare  Z47.89                      Subjective: Patient reports knee feeling pretty good. Reports did a little too much Saturday and was sore for a day and a half but better now.      Objective: See treatment diary below      Assessment: Ca Ortega tolerated treatment well. Knee stability improving. Continued treatment indicated.       Plan: Continue per plan of care.      Precautions: L post-op TKR 24    Daily Treatment Diary:      Initial Evaluation Date: 08/15/24  POC Expiration: 24  Compliance 9/17 9/19 9/24       9/10  9/12   Visit Number 11 12 13       9  10   Re-Eval                   MC   Foto Captured                             Manuals    Joint work         -hip         -knee         ST work         Flexibility/PROM      KS   Retro drainage          assessment        Neuro Re-Ed         balance SLS 8x5\" ea SLS 8x5\" ea   Tandem 3x20\" ea Tandem 20\"/3ea                     Step-ups fwd 8\" 2x12 6\"   2x12   4\" 2x12 4\" 2x12   Step-ups up/down 6\" x10 6\"x10                lunge         Ther Ex         NuStep/bike 6' SRB - ROM 6' SRB - ROM   5' recumbent bike 5' RB   Quad sets     2x12 2x12            SLR program     3x8 slight lag, corrected with cuing 3x8 very slight lag, corrected c/ cues   Sidestepping      5x10' 10'/5   SAQ 3x12 2# 3x12 2#   3x10 1# 3x10 1#    Standing heel raise 5# db 2x10 5# db 3x10       TKE          Wall gastroc stretch     4x20\" 20\"/4   Gastroc stretch         AAROM flexion         Heel slides     X20  x20   Mini squats         Hamstring machine  40# U ECC 3x10       Leg press 60# 3x10 60# 3x10       clamshell Gtb 3x 15 s/l Gtb 3x 15 s/l       LAQ 3x10 4# 3x10 4#   3x10 1# 3x10 1#   education      Scar " Massage, Desensitization            Ther Activity         Stair negotiation                  Gait Training                           Modalities         Ice      10 min nv

## 2024-09-26 ENCOUNTER — OFFICE VISIT (OUTPATIENT)
Dept: PHYSICAL THERAPY | Facility: CLINIC | Age: 54
End: 2024-09-26
Payer: COMMERCIAL

## 2024-09-26 DIAGNOSIS — M25.562 CHRONIC PAIN OF LEFT KNEE: Primary | ICD-10-CM

## 2024-09-26 DIAGNOSIS — Z47.89 ORTHOPEDIC AFTERCARE: ICD-10-CM

## 2024-09-26 DIAGNOSIS — G89.29 CHRONIC PAIN OF LEFT KNEE: Primary | ICD-10-CM

## 2024-09-26 PROCEDURE — 97112 NEUROMUSCULAR REEDUCATION: CPT

## 2024-09-26 PROCEDURE — 97110 THERAPEUTIC EXERCISES: CPT

## 2024-09-26 NOTE — PROGRESS NOTES
"Daily Note     Today's date: 2024  Patient name: Ca Ortega  : 1970  MRN: [id number]  Referring provider: Lupillo Prieto MD  Dx:   Encounter Diagnosis     ICD-10-CM    1. Chronic pain of left knee  M25.562     G89.29       2. Orthopedic aftercare  Z47.89                      Subjective: Patient reports she could tell she worked her hamstring yesterday.       Objective: See treatment diary below      Assessment: Ca Ortega tolerated treatment well. Knee strength and stability improving. Continued treatment indicated.       Plan: Continue per plan of care.      Precautions: L post-op TKR 24    Daily Treatment Diary:      Initial Evaluation Date: 08/15/24  POC Expiration: 24  Compliance 9/17 9/19 9/24 9/26      9/10  9/12   Visit Number 11 12 13 14      9  10   [facility]-Eval                   MC   Foto Captured                             Manuals    Joint work         -hip         -knee         ST work         Flexibility/PROM      KS   Retro drainage          assessment        Neuro Re-Ed         balance SLS 8x5\" ea SLS 8x5\" ea SLS 8x5\" ea  Tandem 3x20\" ea Tandem 20\"/3ea                     Step-ups fwd 8\" 2x12 6\"   2x12 6\"   2x12  4\" 2x12 4\" 2x12   Step-ups up/down 6\" x10 6\"x10 6\"x10      Lateral step ups   6\" 2x10      lunge         Ther Ex         NuStep/bike 6' SRB - ROM 6' SRB - ROM 7' upright ROM  5' recumbent bike 5' RB   Quad sets     2x12 2x12            SLR program     3x8 slight lag, corrected with cuing 3x8 very slight lag, corrected c/ cues   Sidestepping      5x10' 10'/5   SAQ 3x12 2# 3x12 2# 3x12 3#  3x10 1# 3x10 1#    Standing heel raise 5# db 2x10 5# db 3x10 5# db 3x12      TKE          Wall gastroc stretch     4x20\" 20\"/4   Gastroc stretch         AAROM flexion         Heel slides     X20  x20   Mini squats         Hamstring machine  40# U ECC 3x10 40# 3x10      Leg press 60# 3x10 60# 3x10 60# 3x10      clamshell Gtb 3x 15 s/l Gtb 3x 15 s/l btb 3x 15 s/l   "    LAQ 3x10 4# 3x10 4# 3x10 5#  3x10 1# 3x10 1#   education      Scar Massage, Desensitization            Ther Activity         Stair negotiation                  Gait Training                           Modalities         Ice      10 min nv

## 2024-10-01 ENCOUNTER — OFFICE VISIT (OUTPATIENT)
Dept: PHYSICAL THERAPY | Facility: CLINIC | Age: 54
End: 2024-10-01
Payer: COMMERCIAL

## 2024-10-01 DIAGNOSIS — Z47.89 ORTHOPEDIC AFTERCARE: ICD-10-CM

## 2024-10-01 DIAGNOSIS — G89.29 CHRONIC PAIN OF LEFT KNEE: Primary | ICD-10-CM

## 2024-10-01 DIAGNOSIS — M25.562 CHRONIC PAIN OF LEFT KNEE: Primary | ICD-10-CM

## 2024-10-01 PROCEDURE — 97110 THERAPEUTIC EXERCISES: CPT

## 2024-10-01 PROCEDURE — 97112 NEUROMUSCULAR REEDUCATION: CPT

## 2024-10-01 NOTE — PROGRESS NOTES
"Daily Note     Today's date: 10/1/2024  Patient name: Ca Ortega  : 1970  MRN: [id number]  Referring provider: Lupillo Prieto MD  Dx:   Encounter Diagnosis     ICD-10-CM    1. Chronic pain of left knee  M25.562     G89.29       2. Orthopedic aftercare  Z47.89                      Subjective: Pt reports she is doing okay today, she is back to work now and she is doing a lot of sitting at work, which her knee \"does not like.\" She has been trying to get up and walk occasionally to break up the long periods of sitting.       Objective: See treatment diary below      Assessment: Tolerated treatment well. Pt performed all exercises without issue, continue to progress to tolerance. Pt showed good form with exercises, was challenged by fwd and lat step ups. Pt would benefit from continued focus on strengthening and stability-based exercises to improve function and ROM. Patient demonstrated fatigue post treatment, exhibited good technique with therapeutic exercises, and would benefit from continued PT      Plan: Continue per plan of care.      Precautions: L post-op TKR 24    Daily Treatment Diary:      Initial Evaluation Date: 08/15/24  POC Expiration: 24  Compliance 9/17 9/19 9/24 9/26 10/1     9/10  9/12   Visit Number 11 12 13 14 15     9  10   [facility]-Eval                   MC   Foto Captured                             Manuals 9/19 9/24 9/26 10/1 9/12 9/17   Joint work         -hip         -knee         ST work         Flexibility/PROM      KS   Retro drainage          assessment        Neuro Re-Ed         balance SLS 8x5\" ea SLS 8x5\" ea SLS 8x5\" ea SLS 10x5\" ea Tandem 3x20\" ea Tandem 20\"/3ea                     Step-ups fwd 8\" 2x12 6\"   2x12 6\"   2x12 6\" 2x12 4\" 2x12 4\" 2x12   Step-ups up/down 6\" x10 6\"x10 6\"x10 6\"x10     Lateral step ups   6\" 2x10 6\" 2x10     lunge         Ther Ex         NuStep/bike 6' SRB - ROM 6' SRB - ROM 7' upright ROM 7' upright bike 5' recumbent bike 5' RB   Quad sets     2x12 " "2x12            SLR program     3x8 slight lag, corrected with cuing 3x8 very slight lag, corrected c/ cues   Sidestepping      5x10' 10'/5   SAQ 3x12 2# 3x12 2# 3x12 3# 3x12 4# 3x10 1# 3x10 1#    Standing heel raise 5# db 2x10 5# db 3x10 5# db 3x12 5# db 3x12     TKE          Wall gastroc stretch     4x20\" 20\"/4   Gastroc stretch         AAROM flexion         Heel slides     X20  x20   Mini squats         Hamstring machine  40# U ECC 3x10 40# 3x10 40#  ECC 3x10     Leg press 60# 3x10 60# 3x10 60# 3x10 60# 3x10     clamshell Gtb 3x 15 s/l Gtb 3x 15 s/l btb 3x 15 s/l Btb 3x15 s/l     LAQ 3x10 4# 3x10 4# 3x10 5# 3x10 5# 3x10 1# 3x10 1#   education      Scar Massage, Desensitization            Ther Activity         Stair negotiation                  Gait Training                           Modalities         Ice      10 min nv                                              "

## 2024-10-03 ENCOUNTER — OFFICE VISIT (OUTPATIENT)
Dept: PHYSICAL THERAPY | Facility: CLINIC | Age: 54
End: 2024-10-03
Payer: COMMERCIAL

## 2024-10-03 DIAGNOSIS — Z47.89 ORTHOPEDIC AFTERCARE: ICD-10-CM

## 2024-10-03 DIAGNOSIS — M25.562 CHRONIC PAIN OF LEFT KNEE: Primary | ICD-10-CM

## 2024-10-03 DIAGNOSIS — G89.29 CHRONIC PAIN OF LEFT KNEE: Primary | ICD-10-CM

## 2024-10-03 PROCEDURE — 97110 THERAPEUTIC EXERCISES: CPT | Performed by: PHYSICAL THERAPY ASSISTANT

## 2024-10-03 PROCEDURE — 97112 NEUROMUSCULAR REEDUCATION: CPT | Performed by: PHYSICAL THERAPY ASSISTANT

## 2024-10-03 NOTE — PROGRESS NOTES
"Daily Note     Today's date: 10/3/2024  Patient name: Ca Ortega  : 1970  MRN: [id number]  Referring provider: Lupillo Prieto MD  Dx:   Encounter Diagnosis     ICD-10-CM    1. Chronic pain of left knee  M25.562     G89.29       2. Orthopedic aftercare  Z47.89                      Subjective: pt reports she is feeling ok but has reported more pain since returning to work and is doing more sitting throughout the day.  Pt reports she is going to trial using a timer so she remembers to stand throughout the day.     Objective: See treatment diary below      Assessment: Tolerated treatment well. Pt performed all exercises without pain and able to progress weight for HS machine and LP this session.  Pt showed good form with exercises, was challenged by fwd and lat step ups reporting mild discomfort laterally.  Pt would benefit from continued focus on strengthening and stability-based exercises to improve function and ROM. Patient demonstrated fatigue post treatment, exhibited good technique with therapeutic exercises, and would benefit from continued PT      Plan: Continue per plan of care.      Precautions: L post-op TKR 24    Daily Treatment Diary:      Initial Evaluation Date: 08/15/24  POC Expiration: 24  Compliance 9/17 9/19 9/24 9/26 10/1 10/3    9/10  9/12   Visit Number 11 12 13 14 15 16    9  10   [facility]-Eval                   MC   Foto Captured                             Manuals 9/19 9/24 9/26 10/1 10/3    Joint work         -hip         -knee         ST work         Flexibility/PROM         Retro drainage          assessment        Neuro Re-Ed         balance SLS 8x5\" ea SLS 8x5\" ea SLS 8x5\" ea SLS 10x5\" ea SLS 10\"x5 ea                      Step-ups fwd 8\" 2x12 6\"   2x12 6\"   2x12 6\" 2x12 6\" 2x12    Step-ups up/down 6\" x10 6\"x10 6\"x10 6\"x10 6\"x10    Lateral step ups   6\" 2x10 6\" 2x10 6\"  2x10    lunge         Ther Ex         NuStep/bike 6' SRB - ROM 6' SRB - ROM 7' upright ROM 7' upright bike 7' " upright bike    Quad sets                  SLR program         Sidestepping          SAQ 3x12 2# 3x12 2# 3x12 3# 3x12 4# 3x12 4#    Standing heel raise 5# db 2x10 5# db 3x10 5# db 3x12 5# db 3x12 10# DB  3x12    TKE          Wall gastroc stretch         Gastroc stretch         AAROM flexion         Heel slides         Mini squats         Hamstring machine  40# U ECC 3x10 40# 3x10 40#  ECC 3x10 50# ECC   3x10    Leg press 60# 3x10 60# 3x10 60# 3x10 60# 3x10 70# 3x10    clamshell Gtb 3x 15 s/l Gtb 3x 15 s/l btb 3x 15 s/l Btb 3x15 s/l BTB 3x15   s/l    LAQ 3x10 4# 3x10 4# 3x10 5# 3x10 5# 3x12  5#    education                  Ther Activity         Stair negotiation                  Gait Training                           Modalities         Ice

## 2024-10-08 ENCOUNTER — OFFICE VISIT (OUTPATIENT)
Dept: PHYSICAL THERAPY | Facility: CLINIC | Age: 54
End: 2024-10-08
Payer: COMMERCIAL

## 2024-10-08 DIAGNOSIS — Z47.89 ORTHOPEDIC AFTERCARE: ICD-10-CM

## 2024-10-08 DIAGNOSIS — G89.29 CHRONIC PAIN OF LEFT KNEE: Primary | ICD-10-CM

## 2024-10-08 DIAGNOSIS — M25.562 CHRONIC PAIN OF LEFT KNEE: Primary | ICD-10-CM

## 2024-10-08 PROCEDURE — 97140 MANUAL THERAPY 1/> REGIONS: CPT | Performed by: PHYSICAL THERAPIST

## 2024-10-08 PROCEDURE — 97110 THERAPEUTIC EXERCISES: CPT | Performed by: PHYSICAL THERAPIST

## 2024-10-08 NOTE — PROGRESS NOTES
"Daily Note     Today's date: 10/8/2024  Patient name: Ca Ortega  : 1970  MRN: [id number]  Referring provider: Lupillo Prieto MD  Dx:   Encounter Diagnosis     ICD-10-CM    1. Chronic pain of left knee  M25.562     G89.29       2. Orthopedic aftercare  Z47.89                      Subjective: gets some stiffness and lateral knee soreness when transitioning from sitting to standing/walking now that she is back at work but once moves it improves      Objective: arom 2-123      Assessment: educated that subjective stiffness after sitting is normal at this time. Gets occasional lateral knee pain yet but improves with repetition. Balance is challenged yet with SLS on the left. Tolerated treatment well. Patient demonstrated fatigue post treatment and would benefit from continued PT      Plan: Continue per plan of care.  Continue with higher level strength and balance, goal of DC in 2-3 visits to [facility]     Precautions: L post-op TKR 24    Daily Treatment Diary:      Initial Evaluation Date: 08/15/24  POC Expiration: 24  Compliance 9/17 9/19 9/24 9/26 10/1 10/3 10/8   9/10  9/12   Visit Number 11 12 13 14 15 16 17   9  10   Re-Eval                   MC   Foto Captured                             Manuals 9/19 9/24 9/26 10/1 10/3 10/8   Joint work         -hip         -knee         ST work         Flexibility/PROM         Retro drainage          assessment        Neuro Re-Ed         balance SLS 8x5\" ea SLS 8x5\" ea SLS 8x5\" ea SLS 10x5\" ea SLS 10\"x5 ea SLS march  20x5\" ea         Tandem ball toss floor 3x30\" ea, AE 3x30\" ea            Step-ups fwd 8\" 2x12 6\"   2x12 6\"   2x12 6\" 2x12 6\" 2x12 8\" 2x15   Step-ups up/down 6\" x10 6\"x10 6\"x10 6\"x10 6\"x10    Lateral step ups   6\" 2x10 6\" 2x10 6\"  2x10 6\" 2x10   lunge         Ther Ex         NuStep/bike 6' SRB - ROM 6' SRB - ROM 7' upright ROM 7' upright bike 7' upright bike 7' upright bike ROM focus   Quad sets                  SLR program         Sidestepping        "   SAQ 3x12 2# 3x12 2# 3x12 3# 3x12 4# 3x12 4#    Standing heel raise 5# db 2x10 5# db 3x10 5# db 3x12 5# db 3x12 10# DB  3x12 10# 2x15   bridge      X10, 2x10 15#   Wall gastroc stretch         Gastroc stretch         AAROM flexion         Heel slides         Mini squats         Hamstring machine  40# U ECC 3x10 40# 3x10 40#  ECC 3x10 50# ECC   3x10 50# 3x12 concentric   Leg press 60# 3x10 60# 3x10 60# 3x10 60# 3x10 70# 3x10 80-90# 3x12 total   clamshell Gtb 3x 15 s/l Gtb 3x 15 s/l btb 3x 15 s/l Btb 3x15 s/l BTB 3x15   s/l Lat walk gtb 3x10   LAQ 3x10 4# 3x10 4# 3x10 5# 3x10 5# 3x12  5# CORY 30# 2x12   education                  Ther Activity         Stair negotiation                  Gait Training                           Modalities         Ice

## 2024-10-10 ENCOUNTER — OFFICE VISIT (OUTPATIENT)
Dept: PHYSICAL THERAPY | Facility: CLINIC | Age: 54
End: 2024-10-10
Payer: COMMERCIAL

## 2024-10-10 DIAGNOSIS — Z47.89 ORTHOPEDIC AFTERCARE: ICD-10-CM

## 2024-10-10 DIAGNOSIS — M25.562 CHRONIC PAIN OF LEFT KNEE: Primary | ICD-10-CM

## 2024-10-10 DIAGNOSIS — G89.29 CHRONIC PAIN OF LEFT KNEE: Primary | ICD-10-CM

## 2024-10-10 PROCEDURE — 97110 THERAPEUTIC EXERCISES: CPT | Performed by: PHYSICAL THERAPIST

## 2024-10-10 PROCEDURE — 97140 MANUAL THERAPY 1/> REGIONS: CPT | Performed by: PHYSICAL THERAPIST

## 2024-10-10 NOTE — PROGRESS NOTES
"Daily Note     Today's date: 10/10/2024  Patient name: Ca Ortega  : 1970  MRN: [id number]  Referring provider: Lupillo Prieto MD  Dx:   Encounter Diagnosis     ICD-10-CM    1. Chronic pain of left knee  M25.562     G89.29       2. Orthopedic aftercare  Z47.89                      Subjective: some lateral knee soreness. Has been doing more stairs at home.       Objective: tenderness lateral joint line and itb L knee      Assessment: patient lateral knee pain appears to be ITB related. Feel with more quad strength this will decrease. Step-up has compensatory pattern with higher step yet. Added wall sit for home - able to get more quad fatigue in deeper angle (approx 75-80 deg). Tolerated treatment well. Patient would benefit from continued PT      Plan: Continue per plan of care.      Precautions: L post-op TKR 24    Daily Treatment Diary:      Initial Evaluation Date: 08/15/24  POC Expiration: 24  Compliance 9/17 9/19 9/24 9/26 10/1 10/3 10/8 10/10     Visit Number 11 12 13 14 15 16 17 18     Re-Eval                    Foto Captured                             Manuals 10/10   10/1 10/3 10/8   Joint work         -hip         -knee         ST work         Flexibility/PROM         Retro drainage                  Neuro Re-Ed         balance SLS ball toss 5x5 ea   SLS 10x5\" ea SLS 10\"x5 ea SLS march  20x5\" ea         Tandem ball toss floor 3x30\" ea, AE 3x30\" ea            Step-ups fwd 8\" x15   6\" 2x12 6\" 2x12 8\" 2x15   Step-ups up/down Up and overs 6\" 2x8   6\"x10 6\"x10    Lateral step ups    6\" 2x10 6\"  2x10 6\" 2x10   lunge         Ther Ex         NuStep/bike 8' ROM   7' upright bike 7' upright bike 7' upright bike ROM focus   Quad sets                  SLR program 1.5# 3x10 flex        Sidestepping          SAQ    3x12 4# 3x12 4#    Standing heel raise 10# 3x15   5# db 3x12 10# DB  3x12 10# 2x15   bridge X10, 2x10 15#     X10, 2x10 15#   Wall gastroc stretch 4x30\"        Gastroc stretch         AAROM " "flexion         Heel slides         Mini squats Wall sit 5x20\"        Hamstring machine 60# 3x10   40#  ECC 3x10 50# ECC   3x10 50# 3x12 concentric   Leg press 90# 3x12   60# 3x10 70# 3x10 80-90# 3x12 total   clamshell Lat walk gtb 3x8-10   Btb 3x15 s/l BTB 3x15   s/l Lat walk gtb 3x10   LAQ CORY 30# 3x10   3x10 5# 3x12  5# CORY 30# 2x12   education                  Ther Activity         Stair negotiation                  Gait Training                           Modalities         Ice                                                         "

## 2024-10-15 ENCOUNTER — OFFICE VISIT (OUTPATIENT)
Dept: PHYSICAL THERAPY | Facility: CLINIC | Age: 54
End: 2024-10-15
Payer: COMMERCIAL

## 2024-10-15 DIAGNOSIS — G89.29 CHRONIC PAIN OF LEFT KNEE: Primary | ICD-10-CM

## 2024-10-15 DIAGNOSIS — M25.562 CHRONIC PAIN OF LEFT KNEE: Primary | ICD-10-CM

## 2024-10-15 DIAGNOSIS — Z47.89 ORTHOPEDIC AFTERCARE: ICD-10-CM

## 2024-10-15 PROCEDURE — 97112 NEUROMUSCULAR REEDUCATION: CPT | Performed by: PHYSICAL THERAPIST

## 2024-10-15 PROCEDURE — 97110 THERAPEUTIC EXERCISES: CPT | Performed by: PHYSICAL THERAPIST

## 2024-10-15 NOTE — PROGRESS NOTES
"Daily Note     Today's date: 10/15/2024  Patient name: Ca Ortega  : 1970  MRN: [id number]  Referring provider: Lupillo Prieto MD  Dx:   Encounter Diagnosis     ICD-10-CM    1. Chronic pain of left knee  M25.562     G89.29       2. Orthopedic aftercare  Z47.89                      Subjective: patient notes she has been doing more on her feet and not having the lateral pain. Walked grocery store and only thing bothering her was low back. Gets the lateral discomfort with stairs but is doing them      Objective: See treatment diary below      Assessment: pt responding well with addition of further strength. Still has small hitch with steps, mostly ascending. Tolerated treatment well. Patient would benefit from continued PT      Plan: Potential discharge next visit.     Precautions: L post-op TKR 24    Daily Treatment Diary:      Initial Evaluation Date: 08/15/24  POC Expiration: 24  Compliance 9/17 9/19 9/24 9/26 10/1 10/3 10/8 10/10 10/15    Visit Number 11 12 13 14 15 16 17 18 19    Re-Eval                    Foto Captured                             Manuals 10/10 10/15  10/1 10/3 10/8   Joint work         -hip         -knee         ST work         Flexibility/PROM         Retro drainage                  Neuro Re-Ed         balance SLS ball toss 5x5 ea SLS ball toss 7x6 ea  SLS 10x5\" ea SLS 10\"x5 ea SLS march  20x5\" ea   tandem  Walk floor and AE 3'    Tandem ball toss floor 3x30\" ea, AE 3x30\" ea            Step-ups fwd 8\" x15   6\" 2x12 6\" 2x12 8\" 2x15   Step-ups up/down Up and overs 6\" 2x8 Up and overs 6\" 2x8  6\"x10 6\"x10    Lateral step ups    6\" 2x10 6\"  2x10 6\" 2x10   lunge         Ther Ex         NuStep/bike 8' ROM 8' UB ROM  7' upright bike 7' upright bike 7' upright bike ROM focus   Quad sets                  SLR program 1.5# 3x10 flex 2# 3x10 flex       Sidestepping          SAQ    3x12 4# 3x12 4#    Standing heel raise 10# 3x15 10# 3x12-15  5# db 3x12 10# DB  3x12 10# 2x15   bridge X10, " "2x10 15# 2x15    X10, 2x10 15#   Wall gastroc stretch 4x30\"        Gastroc stretch         AAROM flexion         Heel slides         Mini squats Wall sit 5x20\"        Hamstring machine 60# 3x10 60# 3x10  40#  ECC 3x10 50# ECC   3x10 50# 3x12 concentric   Leg press 90# 3x12 3x15   60# 3x10 70# 3x10 80-90# 3x12 total   clamshell Lat walk gtb 3x8-10 Lat walk cyrus tb 3x8-10  Btb 3x15 s/l BTB 3x15   s/l Lat walk gtb 3x10   LAQ CORY 30# 3x10   3x10 5# 3x12  5# CORY 30# 2x12   education                  Ther Activity         Stair negotiation                  Gait Training                           Modalities         Ice                                                           "

## 2024-10-17 ENCOUNTER — OFFICE VISIT (OUTPATIENT)
Dept: PHYSICAL THERAPY | Facility: CLINIC | Age: 54
End: 2024-10-17
Payer: COMMERCIAL

## 2024-10-17 DIAGNOSIS — G89.29 CHRONIC PAIN OF LEFT KNEE: Primary | ICD-10-CM

## 2024-10-17 DIAGNOSIS — M25.562 CHRONIC PAIN OF LEFT KNEE: Primary | ICD-10-CM

## 2024-10-17 DIAGNOSIS — Z47.89 ORTHOPEDIC AFTERCARE: ICD-10-CM

## 2024-10-17 PROCEDURE — 97112 NEUROMUSCULAR REEDUCATION: CPT | Performed by: PHYSICAL THERAPIST

## 2024-10-17 PROCEDURE — 97110 THERAPEUTIC EXERCISES: CPT | Performed by: PHYSICAL THERAPIST

## 2024-10-17 NOTE — PROGRESS NOTES
PT Discharge    Today's date: 10/17/2024  Patient name: Ca Ortega  : 1970  MRN: [id number]  Referring provider: Lupillo Prieto MD  Dx:   Encounter Diagnosis     ICD-10-CM    1. Chronic pain of left knee  M25.562     G89.29       2. Orthopedic aftercare  Z47.89                              Assessment  Symptom irritability: low    Assessment details: Pt has progressed nicely and is appropriate for discharge. Has progressed towards or met all of her goals. She is independent with her home program and plans on continuing with a gym program on her own.  Understanding of Dx/Px/POC: good     Prognosis: good    Goals  ST-4 weeks - MET  Able to complete SLR without PT assist to transition to SPC  Knee flexion to 100 deg by week 4  Independent with HEP for swelling control, mobility and quad activation    LT-8 weeks - MET  Ambulate without AD  Quad and hamstring strength minimum 4/5  Independent with home strengthening  Complete stairs reciprocally   SLS 15 sec good control    Plan  Patient would benefit from: skilled physical therapy  Planned modality interventions: cryotherapy and thermotherapy: hydrocollator packs    Planned therapy interventions: manual therapy, neuromuscular re-education, self care, therapeutic activities, therapeutic exercise, home exercise program and gait training    Frequency: 2-3x week  Duration in weeks: 12  Plan of Care beginning date: 8/15/2024  Plan of Care expiration date: 2024  Treatment plan discussed with: patient  Plan details: discharged            Subjective Evaluation    History of Present Illness  Mechanism of injury: Patient doing well and is ready for discharge. Will be starting gym program. Only complaint is some pressure in knee with going up stairs but gets better as she does them.   Patient Goals  Patient goals for therapy: increased strength, independence with ADLs/IADLs, return to sport/leisure activities, decreased pain, decreased edema and increased  "motion    Pain  At worst pain ratin    Treatments  Current treatment: physical therapy      Objective    Observation: normal gait    L Knee arom: 0-123    L MMT  Knee ext 4+/5 mid range, 4+/5 terminal  Knee flex: 5/5  Hip flex: 5/5  Hip abd:4/5    SLS: 15 sec+     Precautions: L post-op TKR 24    Daily Treatment Diary:      Initial Evaluation Date: 08/15/24  POC Expiration: 24  Compliance 9/17 9/19 9/24 9/26 10/1 10/3 10/8 10/10 10/15  10/17   Visit Number 11 12 13 14 15 16 17 18 19  20   Re-Eval                        Foto Captured                                   Manuals 10/10 10/15  10/17 10/1 10/3 10/8   Joint work               -hip               -knee               ST work               Flexibility/PROM               Retro drainage                               Neuro Re-Ed               balance SLS ball toss 5x5 ea SLS ball toss 7x6 ea  SLS ball toss 8x5 ea SLS 10x5\" ea SLS 10\"x5 ea SLS march  20x5\" ea   tandem   Walk floor and AE 3'  AE walk 3'     Tandem ball toss floor 3x30\" ea, AE 3x30\" ea                   Step-ups fwd 8\" x15     6\" 2x12 6\" 2x12 8\" 2x15   Step-ups up/down Up and overs 6\" 2x8 Up and overs 6\" 2x8  up and overs 8\" x16 6\"x10 6\"x10     Lateral step ups       6\" 2x10 6\"  2x10 6\" 2x10   lunge               Ther Ex               NuStep/bike 8' ROM 8' UB ROM  6' ub ROM 7' upright bike 7' upright bike 7' upright bike ROM focus   Quad sets                               SLR program 1.5# 3x10 flex 2# 3x10 flex  d/c         Sidestepping                SAQ       3x12 4# 3x12 4#     Standing heel raise 10# 3x15 10# 3x12-15  10# 3x12-15 5# db 3x12 10# DB  3x12 10# 2x15   bridge X10, 2x10 15# 2x15  x30     X10, 2x10 15#   Wall gastroc stretch 4x30\"             Gastroc stretch               AAROM flexion               Heel slides               Mini squats Wall sit 5x20\"             Hamstring machine 60# 3x10 60# 3x10  60# 3x10 40#  ECC 3x10 50# ECC   3x10 50# 3x12 concentric   Leg press 90# " 3x12 3x15   # 3x15 60# 3x10 70# 3x10 80-90# 3x12 total   clamshell Lat walk gtb 3x8-10 Lat walk cyrus tb 3x8-10   Btb 3x15 s/l BTB 3x15   s/l Lat walk gtb 3x10   LAQ CORY 30# 3x10     3x10 5# 3x12  5# CORY 30# 2x12   education      spent time reviewing HEP                         Ther Activity               Stair negotiation                               Gait Training                                               Modalities               Ice                              Access Code: [email or web address]  URL: [email or web address]  Date: 10/17/2024  Prepared by: Spike Ramirez    Exercises  - Supine Bridge  - 1 x daily - 7 x weekly - 3 sets - 10 reps  - Supine Active Straight Leg Raise  - 1 x daily - 7 x weekly - 3 sets - 10 reps  - Single Leg Stance  - 1 x daily - 7 x weekly - 3 sets - 10 reps  - Standing Heel Raises  - 1 x daily - 7 x weekly - 3 sets - 10 reps  - Side Stepping with Resistance at Ankles  - 1 x daily - 7 x weekly - 3 sets - 10 reps

## 2025-07-07 ENCOUNTER — APPOINTMENT (OUTPATIENT)
Dept: LAB | Facility: CLINIC | Age: 55
End: 2025-07-07
Payer: COMMERCIAL

## 2025-07-07 DIAGNOSIS — Z00.00 ROUTINE GENERAL MEDICAL EXAMINATION AT A HEALTH CARE FACILITY: ICD-10-CM

## 2025-07-07 DIAGNOSIS — E03.9 ACQUIRED HYPOTHYROIDISM: ICD-10-CM

## 2025-07-07 LAB
ALBUMIN SERPL BCG-MCNC: 4.4 G/DL (ref 3.5–5)
ALP SERPL-CCNC: 63 U/L (ref 34–104)
ALT SERPL W P-5'-P-CCNC: 18 U/L (ref 7–52)
ANION GAP SERPL CALCULATED.3IONS-SCNC: 6 MMOL/L (ref 4–13)
AST SERPL W P-5'-P-CCNC: 18 U/L (ref 13–39)
BASOPHILS # BLD AUTO: 0.03 THOUSANDS/ÂΜL (ref 0–0.1)
BASOPHILS NFR BLD AUTO: 1 % (ref 0–1)
BILIRUB SERPL-MCNC: 0.55 MG/DL (ref 0.2–1)
BUN SERPL-MCNC: 14 MG/DL (ref 5–25)
CALCIUM SERPL-MCNC: 9.4 MG/DL (ref 8.4–10.2)
CHLORIDE SERPL-SCNC: 105 MMOL/L (ref 96–108)
CHOLEST SERPL-MCNC: 207 MG/DL (ref ?–200)
CO2 SERPL-SCNC: 26 MMOL/L (ref 21–32)
CREAT SERPL-MCNC: 0.73 MG/DL (ref 0.6–1.3)
EOSINOPHIL # BLD AUTO: 0 THOUSAND/ÂΜL (ref 0–0.61)
EOSINOPHIL NFR BLD AUTO: 0 % (ref 0–6)
ERYTHROCYTE [DISTWIDTH] IN BLOOD BY AUTOMATED COUNT: 13 % (ref 11.6–15.1)
GFR SERPL CREATININE-BSD FRML MDRD: 93 ML/MIN/1.73SQ M
GLUCOSE P FAST SERPL-MCNC: 94 MG/DL (ref 65–99)
HCT VFR BLD AUTO: 37.4 % (ref 34.8–46.1)
HDLC SERPL-MCNC: 81 MG/DL
HGB BLD-MCNC: 12.1 G/DL (ref 11.5–15.4)
IMM GRANULOCYTES # BLD AUTO: 0.01 THOUSAND/UL (ref 0–0.2)
IMM GRANULOCYTES NFR BLD AUTO: 0 % (ref 0–2)
LDLC SERPL CALC-MCNC: 110 MG/DL (ref 0–100)
LYMPHOCYTES # BLD AUTO: 1.12 THOUSANDS/ÂΜL (ref 0.6–4.47)
LYMPHOCYTES NFR BLD AUTO: 23 % (ref 14–44)
MCH RBC QN AUTO: 30.8 PG (ref 26.8–34.3)
MCHC RBC AUTO-ENTMCNC: 32.4 G/DL (ref 31.4–37.4)
MCV RBC AUTO: 95 FL (ref 82–98)
MONOCYTES # BLD AUTO: 0.35 THOUSAND/ÂΜL (ref 0.17–1.22)
MONOCYTES NFR BLD AUTO: 7 % (ref 4–12)
NEUTROPHILS # BLD AUTO: 3.33 THOUSANDS/ÂΜL (ref 1.85–7.62)
NEUTS SEG NFR BLD AUTO: 69 % (ref 43–75)
NONHDLC SERPL-MCNC: 126 MG/DL
NRBC BLD AUTO-RTO: 0 /100 WBCS
PLATELET # BLD AUTO: 252 THOUSANDS/UL (ref 149–390)
PMV BLD AUTO: 11.6 FL (ref 8.9–12.7)
POTASSIUM SERPL-SCNC: 3.8 MMOL/L (ref 3.5–5.3)
PROT SERPL-MCNC: 6.9 G/DL (ref 6.4–8.4)
RBC # BLD AUTO: 3.93 MILLION/UL (ref 3.81–5.12)
SODIUM SERPL-SCNC: 137 MMOL/L (ref 135–147)
TRIGL SERPL-MCNC: 79 MG/DL (ref ?–150)
TSH SERPL DL<=0.05 MIU/L-ACNC: 4.21 UIU/ML (ref 0.45–4.5)
WBC # BLD AUTO: 4.84 THOUSAND/UL (ref 4.31–10.16)

## 2025-07-07 PROCEDURE — 80061 LIPID PANEL: CPT

## 2025-07-07 PROCEDURE — 85025 COMPLETE CBC W/AUTO DIFF WBC: CPT

## 2025-07-07 PROCEDURE — 36415 COLL VENOUS BLD VENIPUNCTURE: CPT

## 2025-07-07 PROCEDURE — 84443 ASSAY THYROID STIM HORMONE: CPT

## 2025-07-07 PROCEDURE — 80053 COMPREHEN METABOLIC PANEL: CPT
